# Patient Record
Sex: MALE | Race: WHITE | Employment: FULL TIME | URBAN - METROPOLITAN AREA
[De-identification: names, ages, dates, MRNs, and addresses within clinical notes are randomized per-mention and may not be internally consistent; named-entity substitution may affect disease eponyms.]

---

## 2017-01-03 ENCOUNTER — HOSPITAL ENCOUNTER (OUTPATIENT)
Dept: PHYSICAL THERAPY | Age: 47
Discharge: HOME | End: 2017-01-03
Payer: COMMERCIAL

## 2017-01-03 PROCEDURE — 97110 THERAPEUTIC EXERCISES: CPT

## 2017-01-03 PROCEDURE — 97140 MANUAL THERAPY 1/> REGIONS: CPT

## 2017-01-03 NOTE — PROGRESS NOTES
PT DAILY TREATMENT NOTE     Patient Name: July Arshad  Date:1/3/2017  : 1970  [x]  Patient  Verified  Payor: Leyla Conner / Plan: Carry Renny Pillai 77 PPO / Product Type: Commerical /    In time:5:05  Out time:6:05  Total Treatment Time (min): 60  Visit #: 2 of 6    Treatment Area: Right shoulder pain [M25.511]    SUBJECTIVE  Pain Level (0-10 scale): 2-3/10  Any medication changes, allergies to medications, adverse drug reactions, diagnosis change, or new procedure performed?: [x] No    [] Yes (see summary sheet for update)  Subjective functional status/changes:   [] No changes reported  \"I don't have as much pain now as I was having at the start of therapy. \"    OBJECTIVE    Modality rationale: decrease pain to improve the patients ability to raise R UE   Min Type Additional Details    [] Estim:  []Unatt       []IFC  []Premod                        []Other:  []w/ice   []w/heat  Position:  Location:    [] Estim: []Att    []TENS instruct  []NMES                    []Other:  []w/US   []w/ice   []w/heat  Position:  Location:    []  Traction: [] Cervical       []Lumbar                       [] Prone          []Supine                       []Intermittent   []Continuous Lbs:  [] before manual  [] after manual    []  Ultrasound: []Continuous   [] Pulsed                           []1MHz   []3MHz W/cm2:  Location:    []  Iontophoresis with dexamethasone         Location: [] Take home patch   [] In clinic   10 [x]  Ice     []  heat  []  Ice massage  []  Laser   []  Anodyne Position: seated  Location: R shoulder    []  Laser with stim  []  Other:  Position:  Location:    []  Vasopneumatic Device Pressure:       [] lo [] med [] hi   Temperature: [] lo [] med [] hi   [x] Skin assessment post-treatment:  [x]intact []redness- no adverse reaction    []redness  adverse reaction:       42 min Therapeutic Exercise:  [] See flow sheet :   Rationale: increase ROM and increase strength to improve the patients ability to return to golfing    8 min Manual Therapy:  Jt mobs; contract relax IR/ER   Rationale: decrease pain, increase ROM and increase tissue extensibility to improve mobility          With   [] TE   [] TA   [] neuro   [] other: Patient Education: [x] Review HEP    [] Progressed/Changed HEP based on:   [] positioning   [] body mechanics   [] transfers   [] heat/ice application    [] other:      Other Objective/Functional Measures: FOTO 67 pts     Pain Level (0-10 scale) post treatment: 0/10    ASSESSMENT/Changes in Function: Pt able to achieve near full R shoulder ER/IR after manual therapy today. Pain levels now 2-3/10 with end ranges only. Patient will continue to benefit from skilled PT services to address functional mobility deficits, address ROM deficits, address strength deficits, analyze and address soft tissue restrictions, analyze and cue movement patterns, analyze and modify body mechanics/ergonomics, assess and modify postural abnormalities and instruct in home and community integration to attain remaining goals. []  See Plan of Care  []  See progress note/recertification  []  See Discharge Summary         Progress towards goals / Updated goals:  Short Term Goals: To be accomplished in 2 weeks:  1. Pt will report compliance and independence to HEP to help the pt manage their pain and symptoms.    Eval: established  Current status: met  Long Term Goals: To be accomplished in 5 weeks:  1. Pt will increase FOTO score to 72 points to improve ability to perform ADLs. Eval: 65  Current status: progressing - FOTO 67 pts  2. Pt will increase MMT right shoulder flex, ER, IR and elbow EXT to 5/5 with no pain to improve ability to tolerate gym activities with less pain. Eval:right shoulder flex, ER, IR and elbow EXT 4+/5 (pain with flex, ER and IR)    Current status: Still with pain 4+/5 except IR 4/5 - progressing  3.  Pt will increase AROM right shoulder IR (at 90 degs ABD) to 50 degs or more with no pain to improve ability to reach behind his back with more ease.    Eval: 39 degs with pain  Current status: 60 with pain - progressing  4.  Pt will report being able to sleep on his right shoulder without awaking or without pain to improve sleep quality  Eval: reports pain and discomfort with sleeping on his right shoulder/side  Current status: pain not as acute when rolls onto right shoulder at night - progressing     PLAN  []  Upgrade activities as tolerated     []  Continue plan of care  []  Update interventions per flow sheet       []  Discharge due to:_  []  Other:_      Chandrika Evans, PT 1/3/2017  6:05 PM    Future Appointments  Date Time Provider Herminia Henderson   1/5/2017 5:00 PM Chandrika Evans PT [facility]   1/10/2017 5:00 PM Chandrika Evans, [facility]   1/12/2017 5:00 PM Chandrika Evans [facility]   12/21/2017 3:30 PM Dana Roman MD [address] [place] [address]

## 2017-01-05 ENCOUNTER — HOSPITAL ENCOUNTER (OUTPATIENT)
Dept: PHYSICAL THERAPY | Age: 47
Discharge: HOME | End: 2017-01-05
Payer: COMMERCIAL

## 2017-01-05 PROCEDURE — 97140 MANUAL THERAPY 1/> REGIONS: CPT

## 2017-01-05 PROCEDURE — 97110 THERAPEUTIC EXERCISES: CPT

## 2017-01-05 NOTE — PROGRESS NOTES
PT DAILY TREATMENT NOTE     Patient Name: Bobbi Bustillo  Date:2017  : 1970  [x]  Patient  Verified  Payor: Aubrey Deal / Plan: Carry Renny Pillai 77 PPO / Product Type: Commerical /    In time:5:00  Out time:5:58  Total Treatment Time (min): 62  Visit #: 3 of 6    Treatment Area: Right shoulder pain [M25.511]    SUBJECTIVE  Pain Level (0-10 scale): 0/10  Any medication changes, allergies to medications, adverse drug reactions, diagnosis change, or new procedure performed?: [x] No    [] Yes (see summary sheet for update)  Subjective functional status/changes:   [] No changes reported  \"I feel pretty good. I didn't have much problems with the R shoulder after last visit. \"    OBJECTIVE    Modality rationale: decrease pain to improve the patients ability to raise R UE   Min Type Additional Details    [] Estim:  []Unatt       []IFC  []Premod                        []Other:  []w/ice   []w/heat  Position:  Location:    [] Estim: []Att    []TENS instruct  []NMES                    []Other:  []w/US   []w/ice   []w/heat  Position:  Location:    []  Traction: [] Cervical       []Lumbar                       [] Prone          []Supine                       []Intermittent   []Continuous Lbs:  [] before manual  [] after manual    []  Ultrasound: []Continuous   [] Pulsed                           []1MHz   []3MHz W/cm2:  Location:    []  Iontophoresis with dexamethasone         Location: [] Take home patch   [] In clinic   10 [x]  Ice     []  heat  []  Ice massage  []  Laser   []  Anodyne Position: seated  Location: R shoulder    []  Laser with stim  []  Other:  Position:  Location:    []  Vasopneumatic Device Pressure:       [] lo [] med [] hi   Temperature: [] lo [] med [] hi   [x] Skin assessment post-treatment:  [x]intact []redness- no adverse reaction    []redness  adverse reaction:       38 min Therapeutic Exercise:  [] See flow sheet :   Rationale: increase ROM and increase strength to improve the patients ability to return to golfing    10 min Manual Therapy:  Scapular mobs; GHJ glides; contract relax IR/ER   Rationale: decrease pain, increase ROM and increase tissue extensibility to improve mobility          With   [] TE   [] TA   [] neuro   [] other: Patient Education: [x] Review HEP    [] Progressed/Changed HEP based on:   [] positioning   [] body mechanics   [] transfers   [] heat/ice application    [] other:      Other Objective/Functional Measures: FOTO 67 pts     Pain Level (0-10 scale) post treatment: 0/10    ASSESSMENT/Changes in Function: Pt exhibits full ROM of R shoulder with mild pain levels at end ranges of 90/90 IR/ER. Pt to finish out last 3 visits and D/C to continue to self-manage care independently. Patient will continue to benefit from skilled PT services to address functional mobility deficits, address ROM deficits, address strength deficits, analyze and address soft tissue restrictions, analyze and cue movement patterns, analyze and modify body mechanics/ergonomics, assess and modify postural abnormalities and instruct in home and community integration to attain remaining goals. []  See Plan of Care  []  See progress note/recertification  []  See Discharge Summary         Progress towards goals / Updated goals:  Short Term Goals: To be accomplished in 2 weeks:  1. Pt will report compliance and independence to HEP to help the pt manage their pain and symptoms.    Eval: established  Current status: met  Long Term Goals: To be accomplished in 5 weeks:  1. Pt will increase FOTO score to 72 points to improve ability to perform ADLs. Eval: 65  Current status: progressing - FOTO 67 pts  2. Pt will increase MMT right shoulder flex, ER, IR and elbow EXT to 5/5 with no pain to improve ability to tolerate gym activities with less pain.   Eval:right shoulder flex, ER, IR and elbow EXT 4+/5 (pain with flex, ER and IR)    Current status: progressing - Still with pain 4+/5 except IR 4/5   3. Pt will increase AROM right shoulder IR (at 90 degs ABD) to 50 degs or more with no pain to improve ability to reach behind his back with more ease.    Eval: 39 degs with pain  Current status: progressing - 60 deg with pain   4.  Pt will report being able to sleep on his right shoulder without awaking or without pain to improve sleep quality  Eval: reports pain and discomfort with sleeping on his right shoulder/side  Current status: met - able to sleep on R shoulder without much discomfort     PLAN  []  Upgrade activities as tolerated     []  Continue plan of care  []  Update interventions per flow sheet       []  Discharge due to:_  [x]  Other:_Finish out visits and D/C      Chandrika Evans, PT 1/5/2017  6:05 PM    Future Appointments  Date Time Provider Herminia Henderson   1/10/2017 5:00 PM Miracle Evans, PT [facility]   1/12/2017 5:00 PM Chandrika Evans PT [facility]   12/21/2017 3:30 PM Manolo Donis MD [address] [place] [address]

## 2017-01-10 ENCOUNTER — HOSPITAL ENCOUNTER (OUTPATIENT)
Dept: PHYSICAL THERAPY | Age: 47
Discharge: HOME | End: 2017-01-10
Payer: COMMERCIAL

## 2017-01-10 PROCEDURE — 97110 THERAPEUTIC EXERCISES: CPT

## 2017-01-10 PROCEDURE — 97140 MANUAL THERAPY 1/> REGIONS: CPT

## 2017-01-10 NOTE — PROGRESS NOTES
PT DAILY TREATMENT NOTE     Patient Name: Jennifer Stratton  Date:1/10/2017  : 1970  [x]  Patient  Verified  Payor: Liss Case / Plan: Carli Renny Pillai 77 PPO / Product Type: Commerical /    In time:5:00  Out time:6:00  Total Treatment Time (min): 60  Visit #: 4 of 6    Treatment Area: Right shoulder pain [M25.511]    SUBJECTIVE  Pain Level (0-10 scale): 0/10  Any medication changes, allergies to medications, adverse drug reactions, diagnosis change, or new procedure performed?: [x] No    [] Yes (see summary sheet for update)  Subjective functional status/changes:   [] No changes reported  \"I've had some pain while sleeping. If I lie on my R side with the hand bent towards my ear, the shoulder hurts a little. I have to sleep with the arm straight down by my side. \"    OBJECTIVE    Modality rationale: decrease pain to improve the patients ability to raise R UE   Min Type Additional Details    [] Estim:  []Unatt       []IFC  []Premod                        []Other:  []w/ice   []w/heat  Position:  Location:    [] Estim: []Att    []TENS instruct  []NMES                    []Other:  []w/US   []w/ice   []w/heat  Position:  Location:    []  Traction: [] Cervical       []Lumbar                       [] Prone          []Supine                       []Intermittent   []Continuous Lbs:  [] before manual  [] after manual    []  Ultrasound: []Continuous   [] Pulsed                           []1MHz   []3MHz W/cm2:  Location:    []  Iontophoresis with dexamethasone         Location: [] Take home patch   [] In clinic   10 [x]  Ice     []  heat  []  Ice massage  []  Laser   []  Anodyne Position: seated  Location: R shoulder    []  Laser with stim  []  Other:  Position:  Location:    []  Vasopneumatic Device Pressure:       [] lo [] med [] hi   Temperature: [] lo [] med [] hi   [x] Skin assessment post-treatment:  [x]intact []redness- no adverse reaction    []redness  adverse reaction:       42 min Therapeutic Exercise:  [] See flow sheet :   Rationale: increase ROM and increase strength to improve the patients ability to return to golfing    8 min Manual Therapy:  Scapular mobs; manual stretching all planes   Rationale: decrease pain, increase ROM and increase tissue extensibility to improve mobility          With   [] TE   [] TA   [] neuro   [] other: Patient Education: [x] Review HEP    [] Progressed/Changed HEP based on:   [] positioning   [] body mechanics   [] transfers   [] heat/ice application    [] other:      Other Objective/Functional Measures: Continued with current program     Pain Level (0-10 scale) post treatment: 0/10    ASSESSMENT/Changes in Function: Pt continues to progress with end range flex/abd and 90/90 ER. Pt able to achieve pain free status at end of session. Patient will continue to benefit from skilled PT services to address functional mobility deficits, address ROM deficits, address strength deficits, analyze and address soft tissue restrictions, analyze and cue movement patterns, analyze and modify body mechanics/ergonomics, assess and modify postural abnormalities and instruct in home and community integration to attain remaining goals. []  See Plan of Care  []  See progress note/recertification  []  See Discharge Summary         Progress towards goals / Updated goals:  Short Term Goals: To be accomplished in 2 weeks:  1. Pt will report compliance and independence to HEP to help the pt manage their pain and symptoms.    Eval: established  Current status: met  Long Term Goals: To be accomplished in 5 weeks:  1. Pt will increase FOTO score to 72 points to improve ability to perform ADLs. Eval: 65  Current status: progressing - FOTO 67 pts  2. Pt will increase MMT right shoulder flex, ER, IR and elbow EXT to 5/5 with no pain to improve ability to tolerate gym activities with less pain.   Eval:right shoulder flex, ER, IR and elbow EXT 4+/5 (pain with flex, ER and IR)    Current status: progressing - Still with pain 4+/5 except IR 4/5   3. Pt will increase AROM right shoulder IR (at 90 degs ABD) to 50 degs or more with no pain to improve ability to reach behind his back with more ease.    Eval: 39 degs with pain  Current status: progressing - 60 deg with pain   4.  Pt will report being able to sleep on his right shoulder without awaking or without pain to improve sleep quality  Eval: reports pain and discomfort with sleeping on his right shoulder/side  Current status: met - able to sleep on R shoulder without much discomfort     PLAN  []  Upgrade activities as tolerated     []  Continue plan of care  []  Update interventions per flow sheet       []  Discharge due to:_  [x]  Other:_Finish out visits and D/C      Chandrika Evans PT 1/10/2017  6:05 PM    Future Appointments  Date Time Provider Herminia Henderson   1/12/2017 5:00 PM [address] MIESHA Evans [facility]   12/21/2017 3:30 PM Ky Verde MD [address] [place] [address]

## 2017-01-12 ENCOUNTER — HOSPITAL ENCOUNTER (OUTPATIENT)
Dept: PHYSICAL THERAPY | Age: 47
Discharge: HOME | End: 2017-01-12
Payer: COMMERCIAL

## 2017-01-12 PROCEDURE — 97140 MANUAL THERAPY 1/> REGIONS: CPT

## 2017-01-12 PROCEDURE — 97110 THERAPEUTIC EXERCISES: CPT

## 2017-01-12 NOTE — PROGRESS NOTES
PT DAILY TREATMENT NOTE     Patient Name: Shedrick Lennox  Date:2017  : 1970  [x]  Patient  Verified  Payor: Kaylie Diaz / Plan: Carli Renny Pillai 77 PPO / Product Type: Commerical /    In time:5:00  Out time:6:00  Total Treatment Time (min): 60  Visit #: 5 of 6    Treatment Area: Right shoulder pain [M25.511]    SUBJECTIVE  Pain Level (0-10 scale): 0/10  Any medication changes, allergies to medications, adverse drug reactions, diagnosis change, or new procedure performed?: [x] No    [] Yes (see summary sheet for update)  Subjective functional status/changes:   [] No changes reported  \"I've had a long day. I'm just tired. \"    OBJECTIVE    Modality rationale: decrease pain to improve the patients ability to raise R UE   Min Type Additional Details    [] Estim:  []Unatt       []IFC  []Premod                        []Other:  []w/ice   []w/heat  Position:  Location:    [] Estim: []Att    []TENS instruct  []NMES                    []Other:  []w/US   []w/ice   []w/heat  Position:  Location:    []  Traction: [] Cervical       []Lumbar                       [] Prone          []Supine                       []Intermittent   []Continuous Lbs:  [] before manual  [] after manual    []  Ultrasound: []Continuous   [] Pulsed                           []1MHz   []3MHz W/cm2:  Location:    []  Iontophoresis with dexamethasone         Location: [] Take home patch   [] In clinic   10 [x]  Ice     []  heat  []  Ice massage  []  Laser   []  Anodyne Position: seated  Location: R shoulder    []  Laser with stim  []  Other:  Position:  Location:    []  Vasopneumatic Device Pressure:       [] lo [] med [] hi   Temperature: [] lo [] med [] hi   [x] Skin assessment post-treatment:  [x]intact []redness- no adverse reaction    []redness  adverse reaction:       42 min Therapeutic Exercise:  [] See flow sheet :   Rationale: increase ROM and increase strength to improve the patients ability to return to golfing    8 min Manual Therapy:  Scapular mobs; IR contract relax release   Rationale: decrease pain, increase ROM and increase tissue extensibility to improve mobility          With   [x] TE   [] TA   [] neuro   [] other: Patient Education: [x] Review HEP    [x] Progressed/Changed HEP based on: horizontal ADD stretch on foam roll; post capsule stretch  [] positioning   [] body mechanics   [] transfers   [] heat/ice application    [] other:      Other Objective/Functional Measures:      Pain Level (0-10 scale) post treatment: 0/10    ASSESSMENT/Changes in Function: Pt has met all goals and is ready to transition to HEP and gym workout. Pt will finish out last visit and D/C. Patient will continue to benefit from skilled PT services to address functional mobility deficits, address ROM deficits, address strength deficits, analyze and address soft tissue restrictions, analyze and cue movement patterns, analyze and modify body mechanics/ergonomics, assess and modify postural abnormalities and instruct in home and community integration to attain remaining goals. []  See Plan of Care  []  See progress note/recertification  []  See Discharge Summary         Progress towards goals / Updated goals:  Short Term Goals: To be accomplished in 2 weeks:  1. Pt will report compliance and independence to HEP to help the pt manage their pain and symptoms.    Eval: established  Current status: met  Long Term Goals: To be accomplished in 5 weeks:  1. Pt will increase FOTO score to 72 points to improve ability to perform ADLs. Eval: 65  Current status: progressing - FOTO 67 pts (reassess next visit)  2. Pt will increase MMT right shoulder flex, ER, IR and elbow EXT to 5/5 with no pain to improve ability to tolerate gym activities with less pain. Eval:right shoulder flex, ER, IR and elbow EXT 4+/5 (pain with flex, ER and IR)    Current status: met - 5/5 R shoulder strength grossly without pain  3.  Pt will increase AROM right shoulder IR (at 90 degs ABD) to 50 degs or more with no pain to improve ability to reach behind his back with more ease.    Eval: 39 degs with pain  Current status: met - 70 deg without pain   4.  Pt will report being able to sleep on his right shoulder without awaking or without pain to improve sleep quality  Eval: reports pain and discomfort with sleeping on his right shoulder/side  Current status: met - able to sleep on R shoulder without much discomfort     PLAN  []  Upgrade activities as tolerated     []  Continue plan of care  []  Update interventions per flow sheet       []  Discharge due to:_  [x]  Other:_ FOTO and D/C next visit    Chandrika Evans, PT 1/12/2017  6:05 PM    Future Appointments  Date Time Provider Herminia Henderson   12/21/2017 3:30 PM Sajan Woodard MD [address] [place] [address]

## 2017-01-16 ENCOUNTER — HOSPITAL ENCOUNTER (OUTPATIENT)
Dept: PHYSICAL THERAPY | Age: 47
Discharge: HOME | End: 2017-01-16
Payer: COMMERCIAL

## 2017-01-16 PROCEDURE — 97110 THERAPEUTIC EXERCISES: CPT

## 2017-01-16 PROCEDURE — 97140 MANUAL THERAPY 1/> REGIONS: CPT

## 2017-01-16 NOTE — PROGRESS NOTES
PT DAILY TREATMENT NOTE     Patient Name: Ashok Treviño  Date:2017  : 1970  [x]  Patient  Verified  Payor: Sharon Okemah / Plan: Carry Renny Ibarrajose 77 PPO / Product Type: Commerical /    In time:2:30  Out time: 3:40  Total Treatment Time (min): 70  Visit #: 6 of 6    Treatment Area: Right shoulder pain [M25.511]    SUBJECTIVE  Pain Level (0-10 scale): 1-2  Any medication changes, allergies to medications, adverse drug reactions, diagnosis change, or new procedure performed?: [x] No    [] Yes (see summary sheet for update)  Subjective functional status/changes:   [] No changes reported  I'm so much better than when I started    OBJECTIVE    Modality rationale: decrease pain to improve the patients ability to tolerate activity   Min Type Additional Details    [] Estim:  []Unatt       []IFC  []Premod                        []Other:  []w/ice   []w/heat  Position:  Location:    [] Estim: []Att    []TENS instruct  []NMES                    []Other:  []w/US   []w/ice   []w/heat  Position:  Location:    []  Traction: [] Cervical       []Lumbar                       [] Prone          []Supine                       []Intermittent   []Continuous Lbs:  [] before manual  [] after manual    []  Ultrasound: []Continuous   [] Pulsed                           []1MHz   []3MHz W/cm2:  Location:    []  Iontophoresis with dexamethasone         Location: [] Take home patch   [] In clinic   10 [x]  Ice     []  heat  []  Ice massage  []  Laser   []  Anodyne Position: Sitting  Location: R shoulder    []  Laser with stim  []  Other:  Position:  Location:    []  Vasopneumatic Device Pressure:       [] lo [] med [] hi   Temperature: [] lo [] med [] hi   [x] Skin assessment post-treatment:  [x]intact []redness- no adverse reaction    []redness  adverse reaction:       52 min Therapeutic Exercise:  [] See flow sheet :   Rationale: increase ROM and increase strength to improve the patients ability to resume Mitralignfing    8 min Manual Therapy:  Scap mobs, IR contract release   Rationale: decrease pain, increase ROM and increase tissue extensibility to improve mobility and function     With   [] TE   [] TA   [] neuro   [] other: Patient Education: [x] Review HEP    [] Progressed/Changed HEP based on:   [] positioning   [] body mechanics   [] transfers   [] heat/ice application    [] other:      Other Objective/Functional Measures:      Pain Level (0-10 scale) post treatment:  0    ASSESSMENT/Changes in Function: see goals       []  See Plan of Care  []  See progress note/recertification  [x]  See Discharge Summary         Progress towards goals / Updated goals:  1. Pt will report compliance and independence to HEP to help the pt manage their pain and symptoms.    Eval: established  Current status: met  Long Term Goals: To be accomplished in 5 weeks:  1. Pt will increase FOTO score to 72 points to improve ability to perform ADLs. Eval: 65  Current status: progressing - FOTO 67 pts (reassess next visit)  2. Pt will increase MMT right shoulder flex, ER, IR and elbow EXT to 5/5 with no pain to improve ability to tolerate gym activities with less pain. Eval:right shoulder flex, ER, IR and elbow EXT 4+/5 (pain with flex, ER and IR)    Current status: met - 5/5 R shoulder strength grossly without pain  3. Pt will increase AROM right shoulder IR (at 90 degs ABD) to 50 degs or more with no pain to improve ability to reach behind his back with more ease.    Eval: 39 degs with pain  Current status: met - 70 deg without pain   4.  Pt will report being able to sleep on his right shoulder without awaking or without pain to improve sleep quality  Eval: reports pain and discomfort with sleeping on his right shoulder/side  Current status: met - able to sleep on R shoulder without much discomfort      PLAN  []  Upgrade activities as tolerated     []  Continue plan of care  []  Update interventions per flow sheet       [x]  Discharge due to:_  []  Other:_      Linda Hinojosa, MATTHEW 1/16/2017  2:52 PM    Future Appointments  Date Time Provider Herminia Henderson   12/21/2017 3:30 PM Carlos Botello MD [address] [place] [address]

## 2017-01-23 NOTE — PROGRESS NOTES
[facility]MENARES [facility], [address] [place] [address]  [phone] fax    Discharge Summary    Patient name: Hannah Ospina Start of Care: 16   Referral source: Leatha John DO : 1970   Medical/Treatment Diagnosis: Right shoulder pain [M25.511] Onset Date:   Prior Hospitalization: see medical history Provider#: [id number]   Medications: Verified on Patient Summary List     Comorbidities: none  Prior Level of Function: Independent with ADLs, functional and recreational tasks with no right shoulder pain. Plays golf frequently and participates in gym activities. Visits from Start of Care: 10    Missed Visits: 0    Reporting Period : 12/15/16 to 17  Short Term Goals: To be accomplished in 1 week:  1. Pt will report compliance and independence to [facility] to help the pt manage their pain and symptoms.    Eval: established  Current status: met  Long Term Goals: To be accomplished in 5 weeks:  1. Pt will increase FOTO score to 72 points to improve ability to perform ADLs. Eval: 65  Current status: progressing - FOTO 67 pts (reassess next visit)  2. Pt will increase MMT right shoulder flex, ER, IR and elbow EXT to 5/5 with no pain to improve ability to tolerate gym activities with less pain. Eval:right shoulder flex, ER, IR and elbow EXT 4+/5 (pain with flex, ER and IR)    Current status: met - 5/5 R shoulder strength grossly without pain  3. Pt will increase AROM right shoulder IR (at 90 degs ABD) to 50 degs or more with no pain to improve ability to reach behind his back with more ease.    Eval: 39 degs with pain  Current status: met - 70 deg without pain   4.  Pt will report being able to sleep on his right shoulder without awaking or without pain to improve sleep quality  Eval: reports pain and discomfort with sleeping on his right shoulder/side  Current status: met - able to sleep on R shoulder without much discomfort     Assessment/ Summary of Care: Pt made excellent progress with PT. Pt exhibited full R shoulder AROM and strength without pain. Pt is able to sleep on R shoulder without increased pain. Pt is compliant with updated HEP and is appropriate to return to PLOF at this time and be discharged.   Thank you for this referral.    RECOMMENDATIONS:  [x]Discontinue therapy: [x]Patient has reached or is progressing toward set goals      []Patient is non-compliant or has abdicated      []Due to lack of appreciable progress towards set goals    Chandrika Evans, PT 1/23/2017 10:06 AM

## 2017-08-09 ENCOUNTER — OFFICE VISIT (OUTPATIENT)
Dept: UROLOGY | Age: 47
End: 2017-08-09

## 2017-08-09 VITALS
HEIGHT: 70 IN | SYSTOLIC BLOOD PRESSURE: 92 MMHG | BODY MASS INDEX: 30.35 KG/M2 | WEIGHT: 212 LBS | DIASTOLIC BLOOD PRESSURE: 56 MMHG | TEMPERATURE: 97.7 F | HEART RATE: 36 BPM | OXYGEN SATURATION: 96 %

## 2017-08-09 DIAGNOSIS — N45.1 EPIDIDYMITIS, LEFT: Primary | ICD-10-CM

## 2017-08-09 DIAGNOSIS — N36.44 DETRUSOR SPHINCTER DYSSYNERGIA: ICD-10-CM

## 2017-08-09 DIAGNOSIS — N50.812 TESTICULAR PAIN, LEFT: ICD-10-CM

## 2017-08-09 LAB
BILIRUB UR QL STRIP: NEGATIVE
GLUCOSE UR-MCNC: NEGATIVE MG/DL
KETONES P FAST UR STRIP-MCNC: NEGATIVE MG/DL
PH UR STRIP: 5.5 [PH] (ref 4.6–8)
PROT UR QL STRIP: NEGATIVE MG/DL
SP GR UR STRIP: 1.03 (ref 1–1.03)
UA UROBILINOGEN AMB POC: NORMAL (ref 0.2–1)
URINALYSIS CLARITY POC: CLEAR
URINALYSIS COLOR POC: YELLOW
URINE BLOOD POC: NEGATIVE
URINE LEUKOCYTES POC: NEGATIVE
URINE NITRITES POC: NEGATIVE

## 2017-08-09 RX ORDER — CIPROFLOXACIN 500 MG/1
500 TABLET ORAL 2 TIMES DAILY
Qty: 42 TAB | Refills: 0 | Status: SHIPPED | OUTPATIENT
Start: 2017-08-09 | End: 2017-08-30

## 2017-08-09 NOTE — PROGRESS NOTES
Mr. Barbara Hobson has a reminder for a \"due or due soon\" health maintenance. I have asked that he contact his primary care provider for follow-up on this health maintenance.

## 2017-08-09 NOTE — MR AVS SNAPSHOT
Visit Information Date & Time Provider Department Dept. Phone Encounter #  
 8/9/2017  3:30 PM Richmond Siddiqui, [facility] [phone] [id number] Your Appointments 12/21/2017  3:30 PM  
Office Visit with Richmond Siddiqui MD  
[facility] [address]) Appt Note: check up [address] [place] [address]  
[phone] [address] [facility] [address] Upcoming Health Maintenance Date Due DTaP/Tdap/Td series (1 - Tdap) 8/20/1989 INFLUENZA AGE 9 TO ADULT 8/1/2017 Allergies as of 8/9/2017  Review Complete On: 8/9/2017 By: Richmond Siddiqui MD  
  
 Severity Noted Reaction Type Reactions Valium [Diazepam] High 11/30/2012    Other (comments)  
 hyperactivity Current Immunizations  Never Reviewed No immunizations on file. Not reviewed this visit You Were Diagnosed With   
  
 Codes Comments Testicular pain, left    -  Primary ICD-10-CM: R10.727 ICD-9-CM: 608.9 Epididymitis, left     ICD-10-CM: N45.1 ICD-9-CM: 604.90 Vitals BP Pulse Temp Height(growth percentile) Weight(growth percentile) SpO2  
 92/56 (BP 1 Location: Left arm, BP Patient Position: Sitting) (!) 36 97.7 °F (36.5 °C) 5' 10\" (1.778 m) 212 lb (96.2 kg) 96% BMI Smoking Status 30.42 kg/m2 Former Smoker BMI and BSA Data Body Mass Index Body Surface Area  
 30.42 kg/m 2 2.18 m 2 Preferred Pharmacy Pharmacy Name Phone [id number] [place] [facility] [phone] Your Updated Medication List  
  
   
This list is accurate as of: 8/9/17  4:19 PM.  Always use your most recent med list.  
  
  
  
  
 AMBIEN CR 12.5 mg tablet Generic drug:  zolpidem CR Take 12.5 mg by mouth nightly as needed. ciprofloxacin HCl 500 mg tablet Commonly known as:  CIPRO Take 1 Tab by mouth two (2) times a day for 21 days. HYDROmorphone 8 mg tablet Commonly known as:  DILAUDID Take 8 mg by mouth every four (4) hours as needed. ibuprofen 800 mg tablet Commonly known as:  MOTRIN Take  by mouth.  
  
 lidocaine 5 % Commonly known as:  LIDODERM  
  
 MAXALT-MLT 10 mg disintegrating tablet Generic drug:  rizatriptan Take 10 mg by mouth once as needed for Migraine. nadolol 20 mg tablet Commonly known as:  CORGARD Take 20 mg by mouth every other day. oxybutynin 5 mg tablet Commonly known as:  WYLGFRSR Take 1 Tab by mouth daily. promethazine 25 mg tablet Commonly known as:  PHENERGAN Take 25 mg by mouth every six (6) hours as needed. * tamsulosin 0.4 mg capsule Commonly known as:  FLOMAX TAKE 1 CAPSULE BY MOUTH DAILY * tamsulosin 0.4 mg capsule Commonly known as:  FLOMAX Take 1 Cap by mouth daily. ZANAFLEX 4 mg capsule Generic drug:  tiZANidine Take 4 mg by mouth three (3) times daily. * Notice: This list has 2 medication(s) that are the same as other medications prescribed for you. Read the directions carefully, and ask your doctor or other care provider to review them with you. Prescriptions Sent to Pharmacy Refills  
 ciprofloxacin HCl (CIPRO) 500 mg tablet 0 Sig: Take 1 Tab by mouth two (2) times a day for 21 days. Class: Normal  
 Pharmacy: [facility]/pharmacy [address] [place] [address],[address] [place] [address] Ph #: [phone] Route: Oral  
  
We Performed the Following AMB POC URINALYSIS DIP STICK AUTO W/O MICRO [29162 CPT(R)] Patient Instructions Testicular Pain: Care Instructions Your Care Instructions Pain in the testicles can be caused by many things. These include an injury to your testicles, an infection, and testicular torsion.  
Injuries and genital problems most often happen during sports or recreational activities, at work, or in a fall. Pain caused by an injury usually goes away quickly. There is usually no long-term harm to your testicles. Infections that may cause pain include: · An infection of the testicles. This is called orchitis. · An abscess in the scrotum or testicles. · Some sexually transmitted infections (STIs). · A swelling of the tube attached to a testicle. This swelling is called epididymitis. It can cause pain and is sometimes caused by an infection. Testicular torsion happens when a testicle twists on the spermatic cord. This cuts off the blood supply to the testicle. This is a serious condition that requires surgery. Follow-up care is a key part of your treatment and safety. Be sure to make and go to all appointments, and call your doctor if you are having problems. It's also a good idea to know your test results and keep a list of the medicines you take. How can you care for yourself at home? · Rest and protect your testicles and groin. Stop, change, or take a break from any activity that may be causing your pain or soreness. · Put ice or a cold pack on the area for 10 to 20 minutes at a time. Put a thin cloth between the ice and your skin. · Wear briefs, not boxers. Briefs help support the injured area. You can use a jock strap if it helps relieve your pain. · If your doctor prescribed antibiotics, take them as directed. Do not stop taking them just because you feel better. You need to take the full course of antibiotics. · Ask your doctor if you can take an over-the-counter pain medicine, such as acetaminophen (Tylenol), ibuprofen (Advil, Motrin), or naproxen (Aleve). Be safe with medicines. Read and follow all instructions on the label. · If the doctor gave you a prescription medicine for pain, take it as prescribed. When should you call for help? Call your doctor now or seek immediate medical care if: 
· You have severe or increasing pain. · You notice a change in how your testicles look or are positioned in your scrotum. · You notice new or worse swelling in your scrotum. · You have symptoms of a urinary problem, such as a urinary tract infection. These may include: 
¨ Pain or burning when you urinate. ¨ A frequent need to urinate without being able to pass much urine. ¨ Pain in the flank, which is just below the rib cage and above the waist on either side of the back. ¨ Blood in your urine. ¨ A fever. Watch closely for changes in your health, and be sure to contact your doctor if: 
· You do not get better as expected. Where can you learn more? Go to http://florecitaDali Wirelessavila.info/. Enter Q792 in the search box to learn more about \"Testicular Pain: Care Instructions. \" Current as of: August 12, 2016 Content Version: 11.3 © 9714-8786 [employer]. Care instructions adapted under license by [employer] (which disclaims liability or warranty for this information). If you have questions about a medical condition or this instruction, always ask your healthcare professional. [address] any warranty or liability for your use of this information. Introducing [facility]! Caterina Richard introduces [employer] patient portal. Now you can access parts of your medical record, email your doctor's office, and request medication refills online. 1. In your internet browser, go to [email or web address] 2. Click on the First Time User? Click Here link in the Sign In box. You will see the New Member Sign Up page. 3. Enter your [employer] Access Code exactly as it appears below. You will not need to use this code after youve completed the sign-up process. If you do not sign up before the expiration date, you must request a new code. · [employer] Access Code: [email or web address] Expires: 11/7/2017  3:28 PM 
 
 4. Enter the last four digits of your Social Security Number (xxxx) and Date of Birth (mm/dd/yyyy) as indicated and click Submit. You will be taken to the next sign-up page. 5. Create a Peerby ID. This will be your Peerby login ID and cannot be changed, so think of one that is secure and easy to remember. 6. Create a Peerby password. You can change your password at any time. 7. Enter your Password Reset Question and Answer. This can be used at a later time if you forget your password. 8. Enter your e-mail address. You will receive e-mail notification when new information is available in [address] 9. Click Sign Up. You can now view and download portions of your medical record. 10. Click the Download Summary menu link to download a portable copy of your medical information. If you have questions, please visit the Frequently Asked Questions section of the Peerby website. Remember, Peerby is NOT to be used for urgent needs. For medical emergencies, dial 911. Now available from your iPhone and Android! Please provide this summary of care documentation to your next provider. Your primary care clinician is listed as [address]. If you have any questions after today's visit, please call [phone].

## 2017-08-09 NOTE — PATIENT INSTRUCTIONS
Testicular Pain: Care Instructions  Your Care Instructions    Pain in the testicles can be caused by many things. These include an injury to your testicles, an infection, and testicular torsion. Injuries and genital problems most often happen during sports or recreational activities, at work, or in a fall. Pain caused by an injury usually goes away quickly. There is usually no long-term harm to your testicles. Infections that may cause pain include:  · An infection of the testicles. This is called orchitis. · An abscess in the scrotum or testicles. · Some sexually transmitted infections (STIs). · A swelling of the tube attached to a testicle. This swelling is called epididymitis. It can cause pain and is sometimes caused by an infection. Testicular torsion happens when a testicle twists on the spermatic cord. This cuts off the blood supply to the testicle. This is a serious condition that requires surgery. Follow-up care is a key part of your treatment and safety. Be sure to make and go to all appointments, and call your doctor if you are having problems. It's also a good idea to know your test results and keep a list of the medicines you take. How can you care for yourself at home? · Rest and protect your testicles and groin. Stop, change, or take a break from any activity that may be causing your pain or soreness. · Put ice or a cold pack on the area for 10 to 20 minutes at a time. Put a thin cloth between the ice and your skin. · Wear briefs, not boxers. Briefs help support the injured area. You can use a jock strap if it helps relieve your pain. · If your doctor prescribed antibiotics, take them as directed. Do not stop taking them just because you feel better. You need to take the full course of antibiotics. · Ask your doctor if you can take an over-the-counter pain medicine, such as acetaminophen (Tylenol), ibuprofen (Advil, Motrin), or naproxen (Aleve). Be safe with medicines.  Read and follow all instructions on the label. · If the doctor gave you a prescription medicine for pain, take it as prescribed. When should you call for help? Call your doctor now or seek immediate medical care if:  · You have severe or increasing pain. · You notice a change in how your testicles look or are positioned in your scrotum. · You notice new or worse swelling in your scrotum. · You have symptoms of a urinary problem, such as a urinary tract infection. These may include:  ¨ Pain or burning when you urinate. ¨ A frequent need to urinate without being able to pass much urine. ¨ Pain in the flank, which is just below the rib cage and above the waist on either side of the back. ¨ Blood in your urine. ¨ A fever. Watch closely for changes in your health, and be sure to contact your doctor if:  · You do not get better as expected. Where can you learn more? Go to http://florecita-avila.info/. Enter X381 in the search box to learn more about \"Testicular Pain: Care Instructions. \"  Current as of: August 12, 2016  Content Version: 11.3  © 4231-2634 [employer]. Care instructions adapted under license by [employer] (which disclaims liability or warranty for this information). If you have questions about a medical condition or this instruction, always ask your healthcare professional. [address] any warranty or liability for your use of this information.

## 2017-08-10 NOTE — PROGRESS NOTES
Joan May 48 y.o. male     Mr. 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-irritable bladder symptoms previously treated with anticholinergic Ditropan now no longer taken-patient is voiding with a solid stream good control nocturia once per night-no daytime urgency or frequency     PSA 0.4 in 2012  PSA 0.4 in 2014  PSA 0.4 in 2016          Review of Systems:   CNS: No syncope seizures or visual changes no headaches  Respiratory: No wheezing or shortness of breath  CardiovascularNo palpitations or chest pain  Intestinal:No dyspepsia or change in bowel habits  Urinary: Irritable bladder symptoms relieved by alpha blocker treatment  Skeletal: Large joint arthritis  Endocrine:No diabetes no thyroid disease  Other:     Allergies: Allergies   Allergen Reactions    Valium [Diazepam] Other (comments)     hyperactivity      Medications:    Current Outpatient Prescriptions   Medication Sig Dispense Refill    ciprofloxacin HCl (CIPRO) 500 mg tablet Take 1 Tab by mouth two (2) times a day for 21 days. 42 Tab 0    tamsulosin (FLOMAX) 0.4 mg capsule TAKE 1 CAPSULE BY MOUTH DAILY 90 Cap 0    HYDROmorphone (DILAUDID) 8 mg tablet Take 8 mg by mouth every four (4) hours as needed.  tiZANidine (ZANAFLEX) 4 mg capsule Take 4 mg by mouth three (3) times daily.  ibuprofen (MOTRIN) 800 mg tablet Take  by mouth.  zolpidem CR (AMBIEN CR) 12.5 mg tablet Take 12.5 mg by mouth nightly as needed.  rizatriptan (MAXALT-MLT) 10 mg disintegrating tablet Take 10 mg by mouth once as needed for Migraine.  tamsulosin (FLOMAX) 0.4 mg capsule Take 1 Cap by mouth daily.  30 Cap 3    lidocaine (LIDODERM) 5 %(700 mg/patch)       oxybutynin (DITROPAN) 5 mg tablet Take 1 Tab by mouth daily. 30 Tab 6    nadolol (CORGARD) 20 mg tablet Take 20 mg by mouth every other day.  promethazine (PHENERGAN) 25 mg tablet Take 25 mg by mouth every six (6) hours as needed. Past Medical History:   Diagnosis Date    Back problem     Burning with urination     Epilepsy ([address]     as child only    Headache     Heartburn     Joint pain     Trouble in sleeping       Past Surgical History:   Procedure Laterality Date    HX ORTHOPAEDIC      left shoulder surgery    HX ORTHOPAEDIC      right knee surgery     Family History   Problem Relation Age of Onset    Diabetes Mother     Hypertension Mother         Physical Examination: Well-nourished mature male in no apparent distress  Penis is normal  Scrotum is normal  Left epididymis is tender but not enlarged and not indurated mild tenderness left spermatic cord/normal testes bilaterally normal right epididymis and spermatic cord  No inguinal adenopathy hernia or masses  prostate by PAIGE is rounded, smooth, benign in consistency and nontender-no induration no nodularity      Urinalysis: Negative dipstick/nitrite negative    Impression: Left epididymitis (reflux of sterile urine down left vas deferens is most likely mechanism in this case secondary to detrusor sphincter dyssynergia)      Plan: Cipro 500 mg twice daily ×3 weeks             NSAID, scrotal supporter             Continue alpha-blocker treatment with Flomax 0.4 mg daily    rtc 6 weeks if symptoms persist      More than 1/2 of this 25  minute visit was spent in counselling and coordination of care, as described above. Kanika Dickson MD  -electronically signed-    PLEASE NOTE:  This document has been produced using voice recognition software. Unrecognized errors in transcription may be present.

## 2018-02-14 DIAGNOSIS — N36.44 DETRUSOR AND SPHINCTER DYSSYNERGIA: ICD-10-CM

## 2018-02-14 RX ORDER — TAMSULOSIN HYDROCHLORIDE 0.4 MG/1
CAPSULE ORAL
Qty: 30 CAP | Refills: 0 | Status: SHIPPED | OUTPATIENT
Start: 2018-02-14 | End: 2018-05-30 | Stop reason: SDUPTHER

## 2018-03-29 ENCOUNTER — HOSPITAL ENCOUNTER (OUTPATIENT)
Dept: GENERAL RADIOLOGY | Age: 48
Discharge: HOME | End: 2018-03-29
Payer: COMMERCIAL

## 2018-03-29 DIAGNOSIS — R05.9 COUGH: ICD-10-CM

## 2018-03-29 PROCEDURE — 71046 X-RAY EXAM CHEST 2 VIEWS: CPT

## 2018-05-30 DIAGNOSIS — N36.44 DETRUSOR AND SPHINCTER DYSSYNERGIA: ICD-10-CM

## 2018-05-31 RX ORDER — TAMSULOSIN HYDROCHLORIDE 0.4 MG/1
CAPSULE ORAL
Qty: 30 CAP | Refills: 0 | Status: SHIPPED | OUTPATIENT
Start: 2018-05-31 | End: 2018-09-10 | Stop reason: SDUPTHER

## 2018-06-16 ENCOUNTER — HOSPITAL ENCOUNTER (OUTPATIENT)
Dept: LAB | Age: 48
Discharge: HOME | End: 2018-06-16
Payer: COMMERCIAL

## 2018-06-16 LAB
ALBUMIN SERPL-MCNC: 4 G/DL (ref 3.4–5)
ALBUMIN/GLOB SERPL: 1.3 {RATIO} (ref 0.8–1.7)
ALP SERPL-CCNC: 66 U/L (ref 45–117)
ALT SERPL-CCNC: 41 U/L (ref 16–61)
ANION GAP SERPL CALC-SCNC: 7 MMOL/L (ref 3–18)
AST SERPL-CCNC: 28 U/L (ref 15–37)
BASOPHILS # BLD: 0 K/UL (ref 0–0.06)
BASOPHILS NFR BLD: 1 % (ref 0–2)
BILIRUB SERPL-MCNC: 1.6 MG/DL (ref 0.2–1)
BUN SERPL-MCNC: 18 MG/DL (ref 7–18)
BUN/CREAT SERPL: 21 (ref 12–20)
CALCIUM SERPL-MCNC: 8.5 MG/DL (ref 8.5–10.1)
CHLORIDE SERPL-SCNC: 106 MMOL/L (ref 100–108)
CHOLEST SERPL-MCNC: 169 MG/DL
CO2 SERPL-SCNC: 28 MMOL/L (ref 21–32)
CREAT SERPL-MCNC: 0.87 MG/DL (ref 0.6–1.3)
DIFFERENTIAL METHOD BLD: ABNORMAL
EOSINOPHIL # BLD: 0.2 K/UL (ref 0–0.4)
EOSINOPHIL NFR BLD: 4 % (ref 0–5)
ERYTHROCYTE [DISTWIDTH] IN BLOOD BY AUTOMATED COUNT: 12.4 % (ref 11.6–14.5)
GLOBULIN SER CALC-MCNC: 3.1 G/DL (ref 2–4)
GLUCOSE SERPL-MCNC: 89 MG/DL (ref 74–99)
HBA1C MFR BLD: 4.9 % (ref 4.2–5.6)
HCT VFR BLD AUTO: 41.7 % (ref 36–48)
HDLC SERPL-MCNC: 39 MG/DL (ref 40–60)
HDLC SERPL: 4.3 {RATIO} (ref 0–5)
HGB BLD-MCNC: 14.5 G/DL (ref 13–16)
LDLC SERPL CALC-MCNC: 73.4 MG/DL (ref 0–100)
LIPID PROFILE,FLP: ABNORMAL
LYMPHOCYTES # BLD: 1.6 K/UL (ref 0.9–3.6)
LYMPHOCYTES NFR BLD: 27 % (ref 21–52)
MCH RBC QN AUTO: 31.7 PG (ref 24–34)
MCHC RBC AUTO-ENTMCNC: 34.8 G/DL (ref 31–37)
MCV RBC AUTO: 91 FL (ref 74–97)
MONOCYTES # BLD: 0.6 K/UL (ref 0.05–1.2)
MONOCYTES NFR BLD: 9 % (ref 3–10)
NEUTS SEG # BLD: 3.7 K/UL (ref 1.8–8)
NEUTS SEG NFR BLD: 59 % (ref 40–73)
PLATELET # BLD AUTO: 227 K/UL (ref 135–420)
PMV BLD AUTO: 9.1 FL (ref 9.2–11.8)
POTASSIUM SERPL-SCNC: 4.3 MMOL/L (ref 3.5–5.5)
PROT SERPL-MCNC: 7.1 G/DL (ref 6.4–8.2)
RBC # BLD AUTO: 4.58 M/UL (ref 4.7–5.5)
SODIUM SERPL-SCNC: 141 MMOL/L (ref 136–145)
T4 FREE SERPL-MCNC: 0.8 NG/DL (ref 0.7–1.5)
TRIGL SERPL-MCNC: 283 MG/DL (ref ?–150)
TSH SERPL DL<=0.05 MIU/L-ACNC: 1.18 UIU/ML (ref 0.36–3.74)
VLDLC SERPL CALC-MCNC: 56.6 MG/DL
WBC # BLD AUTO: 6.1 K/UL (ref 4.6–13.2)

## 2018-06-16 PROCEDURE — 83036 HEMOGLOBIN GLYCOSYLATED A1C: CPT | Performed by: FAMILY MEDICINE

## 2018-06-16 PROCEDURE — 80061 LIPID PANEL: CPT | Performed by: FAMILY MEDICINE

## 2018-06-16 PROCEDURE — 85025 COMPLETE CBC W/AUTO DIFF WBC: CPT | Performed by: FAMILY MEDICINE

## 2018-06-16 PROCEDURE — 84439 ASSAY OF FREE THYROXINE: CPT | Performed by: FAMILY MEDICINE

## 2018-06-16 PROCEDURE — 80053 COMPREHEN METABOLIC PANEL: CPT | Performed by: FAMILY MEDICINE

## 2018-06-16 PROCEDURE — 84443 ASSAY THYROID STIM HORMONE: CPT | Performed by: FAMILY MEDICINE

## 2018-06-16 PROCEDURE — 36415 COLL VENOUS BLD VENIPUNCTURE: CPT | Performed by: FAMILY MEDICINE

## 2018-09-10 DIAGNOSIS — N36.44 DETRUSOR AND SPHINCTER DYSSYNERGIA: ICD-10-CM

## 2018-09-11 RX ORDER — TAMSULOSIN HYDROCHLORIDE 0.4 MG/1
CAPSULE ORAL
Qty: 30 CAP | Refills: 0 | Status: SHIPPED | OUTPATIENT
Start: 2018-09-11 | End: 2018-10-31 | Stop reason: SDUPTHER

## 2018-10-31 DIAGNOSIS — N36.44 DETRUSOR AND SPHINCTER DYSSYNERGIA: ICD-10-CM

## 2018-10-31 RX ORDER — TAMSULOSIN HYDROCHLORIDE 0.4 MG/1
CAPSULE ORAL
Qty: 30 CAP | Refills: 0 | Status: SHIPPED | OUTPATIENT
Start: 2018-10-31 | End: 2018-12-05 | Stop reason: SDUPTHER

## 2018-10-31 NOTE — TELEPHONE ENCOUNTER
[facility] Per Dr. Lucero Patel Flomax 0.4 mg one daily #30 with no refills sent to pharmacy. Patient made follow up appointment and told he had to keep it to receive additional refills.

## 2018-12-04 ENCOUNTER — OFFICE VISIT (OUTPATIENT)
Dept: UROLOGY | Age: 48
End: 2018-12-04

## 2018-12-04 VITALS
HEIGHT: 70 IN | WEIGHT: 218 LBS | DIASTOLIC BLOOD PRESSURE: 60 MMHG | HEART RATE: 48 BPM | SYSTOLIC BLOOD PRESSURE: 100 MMHG | OXYGEN SATURATION: 94 % | BODY MASS INDEX: 31.21 KG/M2

## 2018-12-04 DIAGNOSIS — Z12.5 PROSTATE CANCER SCREENING: ICD-10-CM

## 2018-12-04 DIAGNOSIS — N36.44 DSD (DETRUSOR AND SPHINCTER DYSSYNERGIA): Primary | ICD-10-CM

## 2018-12-04 DIAGNOSIS — N40.1 BPH WITH OBSTRUCTION/LOWER URINARY TRACT SYMPTOMS: ICD-10-CM

## 2018-12-04 DIAGNOSIS — N13.8 BPH WITH OBSTRUCTION/LOWER URINARY TRACT SYMPTOMS: ICD-10-CM

## 2018-12-04 NOTE — PROGRESS NOTES
Mr. Gamble Tawana has a reminder for a \"due or due soon\" health maintenance. I have asked that he contact his primary care provider for follow-up on this health maintenance.

## 2018-12-04 NOTE — PATIENT INSTRUCTIONS
Epididymitis and Orchitis: Care Instructions  Your Care Instructions    Epididymitis is pain and swelling of the tube that is attached to each testicle. This tube is called the epididymis. Orchitis is pain and swelling of the testicle. Infection with bacteria often causes these conditions. Sexually transmitted infections (STIs) also can cause both conditions. This is often the case in men younger than 28. Other causes in boys and older men are infections from surgery or having a catheter that drains urine. The mumps virus also can cause orchitis. Anti-inflammatory or pain medicines can help with the pain. Antibiotics are used if the problem is caused by bacteria. They are not used if a virus is the cause. Your testicle may stay swollen for many days or even a few weeks. The doctor has checked you carefully, but problems can develop later. If you notice any problems or new symptoms, get medical treatment right away. Follow-up care is a key part of your treatment and safety. Be sure to make and go to all appointments, and call your doctor if you are having problems. It's also a good idea to know your test results and keep a list of the medicines you take. How can you care for yourself at home? · If your doctor prescribed antibiotics, take them as directed. Do not stop taking them just because you feel better. You need to take the full course of antibiotics. · Ask your doctor if you can take an over-the-counter pain medicine, such as acetaminophen (Tylenol), ibuprofen (Advil, Motrin), or naproxen (Aleve). Be safe with medicines. Read and follow all instructions on the label. · Limit your activity to what is comfortable. · Wear snug underwear or an athletic supporter. This can help reduce pain. · Apply either cold or heat to the swollen area. Use the one that works best for your pain. Sitting in a warm bath for 15 minutes twice a day will help reduce the swelling more quickly.   · If you have been told that an STI may have caused your condition, do not have sex until your doctor says it is safe. This will prevent spreading the infection. Tell your sex partner or partners that they need to be checked. They may need treatment. When should you call for help? Call your doctor now or seek immediate medical care if:    · Your pain gets worse.     · You have a new or higher fever.     · You have new or more swelling of your testicle.     · You have new belly pain, or your pain gets worse.    Watch closely for changes in your health, and be sure to contact your doctor if:    · You do not get better as expected. Where can you learn more? Go to http://florecita-avila.info/. Enter S360 in the search box to learn more about \"Epididymitis and Orchitis: Care Instructions. \"  Current as of: March 21, 2018  Content Version: 11.8  © 3870-4927 [employer] Care instructions adapted under license by [employer] (which disclaims liability or warranty for this information). If you have questions about a medical condition or this instruction, always ask your healthcare professional. [address] any warranty or liability for your use of this information.

## 2018-12-05 DIAGNOSIS — N36.44 DETRUSOR AND SPHINCTER DYSSYNERGIA: ICD-10-CM

## 2018-12-05 LAB
BILIRUB UR QL STRIP: NEGATIVE
GLUCOSE UR-MCNC: NEGATIVE MG/DL
KETONES P FAST UR STRIP-MCNC: NEGATIVE MG/DL
PH UR STRIP: 6.5 [PH] (ref 4.6–8)
PROT UR QL STRIP: NEGATIVE
PSA SERPL-MCNC: 0.9 NG/ML (ref 0–4)
SP GR UR STRIP: 1.02 (ref 1–1.03)
UA UROBILINOGEN AMB POC: NORMAL (ref 0.2–1)
URINALYSIS CLARITY POC: CLEAR
URINALYSIS COLOR POC: YELLOW
URINE BLOOD POC: NEGATIVE
URINE LEUKOCYTES POC: NEGATIVE
URINE NITRITES POC: NEGATIVE

## 2018-12-05 RX ORDER — TAMSULOSIN HYDROCHLORIDE 0.4 MG/1
CAPSULE ORAL
Qty: 30 CAP | Refills: 0 | Status: SHIPPED | OUTPATIENT
Start: 2018-12-05 | End: 2019-01-13 | Stop reason: SDUPTHER

## 2018-12-05 NOTE — PROGRESS NOTES
Orpha Brought 46 y.o. male     Mr. Marylene Blades seen today for follow-up detrusor sphincter dyssynergia    Patient is doing well on alpha-blocker therapy with Flomax 0.4 mg daily/Ditropan Rx discontinued because of lack of efficacy  Now voiding with a solid stream good control nocturia once or twice per night which is not bothersome    PSA 0.4 in 2012  PSA 0.4 in 2014  PSA 0.4 in 2016      PVR 10 cc in December 2018     Review of Systems:   CNS: No syncope seizures or visual changes no headaches  Respiratory: No wheezing or shortness of breath  CardiovascularNo palpitations or chest pain  Intestinal:No dyspepsia or change in bowel habits  Urinary: Irritable bladder symptoms relieved by alpha blocker treatment  Skeletal: Large joint arthritis  Endocrine:No diabetes no thyroid disease  Other:       Allergies: Allergies   Allergen Reactions    Valium [Diazepam] Other (comments)     hyperactivity      Medications:    Current Outpatient Medications   Medication Sig Dispense Refill    tamsulosin (FLOMAX) 0.4 mg capsule TAKE 1 CAPSULE BY MOUTH DAILY 90 Cap 0    HYDROmorphone (DILAUDID) 8 mg tablet Take 8 mg by mouth every four (4) hours as needed.  ibuprofen (MOTRIN) 800 mg tablet Take  by mouth.  zolpidem CR (AMBIEN CR) 12.5 mg tablet Take 12.5 mg by mouth nightly as needed.  tamsulosin (FLOMAX) 0.4 mg capsule TAKE 1 CAPSULE BY MOUTH EVERY DAY 30 Cap 0    rizatriptan (MAXALT-MLT) 10 mg disintegrating tablet Take 10 mg by mouth once as needed for Migraine.  lidocaine (LIDODERM) 5 %(700 mg/patch)       oxybutynin (DITROPAN) 5 mg tablet Take 1 Tab by mouth daily. 30 Tab 6    nadolol (CORGARD) 20 mg tablet Take 20 mg by mouth every other day.  tiZANidine (ZANAFLEX) 4 mg capsule Take 4 mg by mouth three (3) times daily.  promethazine (PHENERGAN) 25 mg tablet Take 25 mg by mouth every six (6) hours as needed.            Past Medical History:   Diagnosis Date    Back problem     Burning with urination     Epilepsy ([facility] [address]     as child only    Headache(784.0)     Heartburn     Joint pain     Trouble in sleeping       Past Surgical History:   Procedure Laterality Date    HX ORTHOPAEDIC      left shoulder surgery    HX ORTHOPAEDIC      right knee surgery     Social History     Socioeconomic History    Marital status:      Spouse name: Not on file    Number of children: Not on file    Years of education: Not on file    Highest education level: Not on file   Social Needs    Financial resource strain: Not on file    Food insecurity - worry: Not on file    Food insecurity - inability: Not on file   [employer] needs - medical: Not on file   [employer] needs - non-medical: Not on file   Occupational History    Not on file   Tobacco Use    Smoking status: Never Smoker    Smokeless tobacco: Never Used   Substance and Sexual Activity    Alcohol use: Yes    Drug use: No    Sexual activity: No   Other Topics Concern    Not on file   Social History Narrative    Not on file      Family History   Problem Relation Age of Onset    Diabetes Mother     Hypertension Mother         Physical Examination: Well-nourished mature male in no apparent distress    Prostate by PAIGE is large rounded smooth benign consistency nontender-no induration no nodularity  No rectal masses induration or tenderness    Urinalysis negative dipstick/nitrite negative/heme-negative      PVR today 10 cc      Impression: Detrusor sphincter dyssynergia responding favorably to alpha-blocker        Plan: PSA today            Flomax 0.4 mg daily #90 refill x3    RTC 1 year PSA PAIGE PVR      More than 1/2 of this 15 minute visit was spent in counselling and coordination of care, as described above. Walter Morejon MD  -electronically signed-    PLEASE NOTE:  This document has been produced using voice recognition software. Unrecognized errors in transcription may be present.

## 2019-01-13 DIAGNOSIS — N36.44 DETRUSOR AND SPHINCTER DYSSYNERGIA: ICD-10-CM

## 2019-01-14 RX ORDER — TAMSULOSIN HYDROCHLORIDE 0.4 MG/1
CAPSULE ORAL
Qty: 30 CAP | Refills: 0 | Status: SHIPPED | OUTPATIENT
Start: 2019-01-14 | End: 2019-05-22 | Stop reason: SDUPTHER

## 2019-05-22 DIAGNOSIS — N36.44 DETRUSOR AND SPHINCTER DYSSYNERGIA: ICD-10-CM

## 2019-05-22 RX ORDER — TAMSULOSIN HYDROCHLORIDE 0.4 MG/1
CAPSULE ORAL
Qty: 30 CAP | Refills: 0 | Status: SHIPPED | OUTPATIENT
Start: 2019-05-22 | End: 2019-07-01 | Stop reason: SDUPTHER

## 2019-07-01 DIAGNOSIS — N36.44 DETRUSOR AND SPHINCTER DYSSYNERGIA: ICD-10-CM

## 2019-07-01 RX ORDER — TAMSULOSIN HYDROCHLORIDE 0.4 MG/1
CAPSULE ORAL
Qty: 30 CAP | Refills: 0 | Status: SHIPPED | OUTPATIENT
Start: 2019-07-01 | End: 2019-08-09 | Stop reason: SDUPTHER

## 2019-08-09 DIAGNOSIS — N36.44 DETRUSOR AND SPHINCTER DYSSYNERGIA: ICD-10-CM

## 2019-08-11 RX ORDER — TAMSULOSIN HYDROCHLORIDE 0.4 MG/1
CAPSULE ORAL
Qty: 30 CAP | Refills: 0 | Status: SHIPPED | OUTPATIENT
Start: 2019-08-11 | End: 2019-10-23 | Stop reason: SDUPTHER

## 2019-10-23 DIAGNOSIS — N36.44 DETRUSOR AND SPHINCTER DYSSYNERGIA: ICD-10-CM

## 2019-10-23 RX ORDER — TAMSULOSIN HYDROCHLORIDE 0.4 MG/1
CAPSULE ORAL
Qty: 30 CAP | Refills: 0 | Status: SHIPPED | OUTPATIENT
Start: 2019-10-23 | End: 2019-12-01 | Stop reason: SDUPTHER

## 2019-11-19 DIAGNOSIS — Z12.5 SCREENING FOR PROSTATE CANCER: ICD-10-CM

## 2019-11-19 DIAGNOSIS — Z12.5 SCREENING FOR PROSTATE CANCER: Primary | ICD-10-CM

## 2019-12-01 DIAGNOSIS — N36.44 DETRUSOR AND SPHINCTER DYSSYNERGIA: ICD-10-CM

## 2019-12-03 RX ORDER — TAMSULOSIN HYDROCHLORIDE 0.4 MG/1
CAPSULE ORAL
Qty: 30 CAP | Refills: 0 | Status: SHIPPED | OUTPATIENT
Start: 2019-12-03 | End: 2021-03-15 | Stop reason: SDUPTHER

## 2019-12-05 ENCOUNTER — HOSPITAL ENCOUNTER (OUTPATIENT)
Dept: LAB | Age: 49
Discharge: HOME | End: 2019-12-05

## 2019-12-05 LAB — XX-LABCORP SPECIMEN COL,LCBCF: NORMAL

## 2019-12-05 PROCEDURE — 99001 SPECIMEN HANDLING PT-LAB: CPT

## 2019-12-06 LAB — PSA SERPL-MCNC: 1 NG/ML (ref 0–4)

## 2019-12-10 ENCOUNTER — OFFICE VISIT (OUTPATIENT)
Dept: UROLOGY | Age: 49
End: 2019-12-10

## 2019-12-10 VITALS
HEART RATE: 46 BPM | BODY MASS INDEX: 31.28 KG/M2 | SYSTOLIC BLOOD PRESSURE: 130 MMHG | OXYGEN SATURATION: 97 % | DIASTOLIC BLOOD PRESSURE: 66 MMHG | HEIGHT: 70 IN

## 2019-12-10 DIAGNOSIS — N36.44 DSD (DETRUSOR AND SPHINCTER DYSSYNERGIA): Primary | ICD-10-CM

## 2019-12-10 LAB
BILIRUB UR QL STRIP: NEGATIVE
GLUCOSE UR-MCNC: NEGATIVE MG/DL
KETONES P FAST UR STRIP-MCNC: NEGATIVE MG/DL
PH UR STRIP: 5.5 [PH] (ref 4.6–8)
PROT UR QL STRIP: NEGATIVE
SP GR UR STRIP: 1.03 (ref 1–1.03)
UA UROBILINOGEN AMB POC: NORMAL (ref 0.2–1)
URINALYSIS CLARITY POC: CLEAR
URINALYSIS COLOR POC: YELLOW
URINE BLOOD POC: NORMAL
URINE LEUKOCYTES POC: NEGATIVE
URINE NITRITES POC: NEGATIVE

## 2019-12-10 RX ORDER — CETIRIZINE HCL 10 MG
10 TABLET ORAL
COMMUNITY
Start: 2019-08-02

## 2019-12-10 RX ORDER — NYSTATIN AND TRIAMCINOLONE ACETONIDE 100000; 1 [USP'U]/G; MG/G
CREAM TOPICAL
COMMUNITY

## 2019-12-10 RX ORDER — TAMSULOSIN HYDROCHLORIDE 0.4 MG/1
0.4 CAPSULE ORAL DAILY
Qty: 90 CAP | Refills: 3 | Status: SHIPPED | OUTPATIENT
Start: 2019-12-10 | End: 2021-03-15 | Stop reason: SDUPTHER

## 2019-12-10 RX ORDER — OXYBUTYNIN CHLORIDE 5 MG/1
5 TABLET ORAL 3 TIMES DAILY
Qty: 90 TAB | Refills: 5 | Status: SHIPPED | OUTPATIENT
Start: 2019-12-10 | End: 2021-08-10

## 2019-12-10 NOTE — PATIENT INSTRUCTIONS
Urine Test: About This Test  What is it? A urine test checks the color, clarity (clear or cloudy), odor, concentration, and acidity (pH) of your urine. It also checks your levels of protein, sugar, blood cells, or other substances in your urine. This test is sometimes called a urinalysis. Why is this test done? A urine test may be done:  · To check for a disease or infection of the urinary tract. The urinary tract includes the kidneys, the tubes that carry urine from the kidneys to the bladder (ureters), and the bladder. It also includes the tube that carries urine from the bladder to outside the body (urethra). · To check the treatment of conditions such as diabetes, kidney stones, a urinary tract infection (UTI), high blood pressure, or some kidney or liver diseases. How can you prepare for the test?  · Before the test, don't eat foods that can change the color of your urine. Examples of these include blackberries, beets, and rhubarb. · Don't do heavy exercise before the test.  · Tell your doctor if you are menstruating or close to starting your period. Your doctor may want to wait to do the test.  · Tell your doctor about all the nonprescription and prescription medicines and herbs or other supplements you take. Some of these can affect the results of this test.  What happens during the test?  A urine test can be done in your doctor's office, clinic, or lab. Or you may be asked to collect a urine sample at home. Then you can take it to the office or lab for testing. Clean-catch midstream urine collection  · Wash your hands before you start. · If the cup you are given has a lid, remove it carefully. Set it down with the inner surface up. Don't touch the inside of the cup with your fingers. · Clean the area around your genitals. ? For men: Pull back the foreskin, if present. Clean the head of your penis with medicated towelettes or swabs. ?  For women: Spread open the genital folds of skin with one hand. Then use medicated towelettes or swabs in your other hand to clean the area where urine comes out (the urethra). Wipe the area from front to back. · Start urinating into the toilet or urinal. A woman should hold apart the genital folds of skin while she urinates. · After the urine has flowed for several seconds, place the cup into the urine stream. Collect about 2 ounces of urine without stopping your flow of urine. · Don't touch the rim of the cup to your genital area. Don't get toilet paper, pubic hair, stool (feces), menstrual blood, or anything else in the urine sample. · Finish urinating into the toilet or urinal.  · Carefully replace and tighten the lid on the cup, and then return it to the lab. If you are collecting the urine at home and can't get it to the lab in an hour, refrigerate it. Double-voided urine sample collection  This method collects the urine your body is making right now. · Urinate into the toilet or urinal. Don't collect any of this urine. · Drink a large glass of water, and wait about 30 to 40 minutes. · Then get a urine sample. Follow the instructions above for collecting a clean-catch urine sample. · Take the urine sample to the lab. If you are collecting the urine at home and can't get it to the lab in an hour, refrigerate it. Follow-up care is a key part of your treatment and safety. Be sure to make and go to all appointments, and call your doctor if you are having problems. It's also a good idea to keep a list of the medicines you take. Ask your doctor when you can expect to have your test results. Where can you learn more? Go to http://florecita-avila.info/. Enter R266 in the search box to learn more about \"Urine Test: About This Test.\"  Current as of: March 28, 2019  Content Version: 12.2  © 5337-5668 [employer].  Care instructions adapted under license by [employer] (which disclaims liability or warranty for this information). If you have questions about a medical condition or this instruction, always ask your healthcare professional. [place] [address] any warranty or liability for your use of this information.

## 2019-12-10 NOTE — PROGRESS NOTES
Mr. Edwin Stephens has a reminder for a \"due or due soon\" health maintenance. I have asked that he contact his primary care provider for follow-up on this health maintenance.

## 2019-12-10 NOTE — PROGRESS NOTES
Vicky Davis 44 y.o. male     Mr. Kiki Sears seen today for annual evaluation of detrusor sphincter dyssynergia (DSD) responding favorably at this time to alpha-blocker therapy alone with Flomax 0.4 mg daily having discontinued taking anticholinergic Ditropan several months ago when no longer experiencing irritative voiding symptoms patient describes a solid steady stream with good control nocturia once per night  PSA 0.4 in 2012  PSA 0.4 in 2014  PSA 0.4 in 2016   PSA 0.9 in December 2018  PSA 1.0 on 6 December 2019       PVR 10 cc in December 2018  PVR 25 cc in December 2019     Review of Systems:   CNS: No syncope seizures or visual changes no headaches  Respiratory: No wheezing or shortness of breath  CardiovascularNo palpitations or chest pain  Intestinal:No dyspepsia or change in bowel habits  Urinary: Irritable bladder symptoms relieved by alpha blocker treatment  Skeletal: Large joint arthritis  Endocrine:No diabetes no thyroid disease  Other:          Allergies: Allergies   Allergen Reactions    Diazepam Other (comments)     hyperactivity  Other reaction(s): neurological reaction, Other  hyperactivity      Hay Fever And Allergy Relief Other (comments)      Medications:    Current Outpatient Medications   Medication Sig Dispense Refill    cetirizine (ZYRTEC) 10 mg tablet cetirizine 10 mg tablet      tamsulosin (FLOMAX) 0.4 mg capsule Take 1 Cap by mouth daily. 90 Cap 3    oxybutynin (DITROPAN) 5 mg tablet Take 1 Tab by mouth three (3) times daily. 90 Tab 5    tamsulosin (FLOMAX) 0.4 mg capsule Take 1 Cap by mouth daily. 90 Cap 3    tamsulosin (FLOMAX) 0.4 mg capsule TAKE 1 CAPSULE BY MOUTH EVERY DAY 30 Cap 0    HYDROmorphone (DILAUDID) 8 mg tablet Take 8 mg by mouth every four (4) hours as needed.  tiZANidine (ZANAFLEX) 4 mg capsule Take 4 mg by mouth three (3) times daily.  ibuprofen (MOTRIN) 800 mg tablet Take  by mouth.         zolpidem CR (AMBIEN CR) 12.5 mg tablet Take 12.5 mg by mouth nightly as needed.  nystatin-triamcinolone (MYCOLOG II) topical cream nystatin-triamcinolone 100,000 unit/g-0.1 % topical cream      rizatriptan (MAXALT-MLT) 10 mg disintegrating tablet Take 10 mg by mouth once as needed for Migraine.  lidocaine (LIDODERM) 5 %(700 mg/patch)       oxybutynin (DITROPAN) 5 mg tablet Take 1 Tab by mouth daily. 30 Tab 6    nadolol (CORGARD) 20 mg tablet Take 20 mg by mouth every other day.  promethazine (PHENERGAN) 25 mg tablet Take 25 mg by mouth every six (6) hours as needed. Past Medical History:   Diagnosis Date    Back problem     Burning with urination     Epilepsy ([facility] [address]     as child only    Headache(784.0)     Heartburn     Joint pain     Trouble in sleeping       Past Surgical History:   Procedure Laterality Date    HX ORTHOPAEDIC      left shoulder surgery    HX ORTHOPAEDIC      right knee surgery     Social History     Socioeconomic History    Marital status:      Spouse name: Not on file    Number of children: Not on file    Years of education: Not on file    Highest education level: Not on file   Occupational History    Not on file   Social Needs    Financial resource strain: Not on file    Food insecurity:     Worry: Not on file     Inability: Not on file    Transportation needs:     Medical: Not on file     Non-medical: Not on file   Tobacco Use    Smoking status: Never Smoker    Smokeless tobacco: Never Used   Substance and Sexual Activity    Alcohol use:  Yes    Drug use: No    Sexual activity: Never   Lifestyle    Physical activity:     Days per week: Not on file     Minutes per session: Not on file    Stress: Not on file   Relationships    Social connections:     Talks on phone: Not on file     Gets together: Not on file     Attends Roman [employer] service: Not on file     Active member of club or organization: Not on file     Attends meetings of clubs or organizations: Not on file     Relationship status: Not on file    Intimate partner violence:     Fear of current or ex partner: Not on file     Emotionally abused: Not on file     Physically abused: Not on file     Forced sexual activity: Not on file   Other Topics Concern    Not on file   Social History Narrative    Not on file      Family History   Problem Relation Age of Onset    Diabetes Mother     Hypertension Mother         Physical Examination: Nourished  trim and fit appearing mature male in no apparent distress    Prostate by PAIGE is rounded, smooth, benign consistency nontender-no induration no nodularity  No rectal masses induration or tenderness    Urinalysis: Negative dipstick/nitrite negative/heme-negative    PVR today 25 cc    Impression: Detrusor sphincter dyssynergia responding favorably to alpha-blocker Rx    Plan: Flomax 0.4 mg daily 90 refill x3    RTC 1 year PSA PAIGE [id number] with [facility] at that time- this office is closing permanently in 3 days       More than 1/2 of this 15 minute visit was spent in counselling and coordination of care, as described above. Constantine Luna MD  -electronically signed-    PLEASE NOTE:  This document has been produced using voice recognition software. Unrecognized errors in transcription may be present.         +

## 2021-07-23 ENCOUNTER — HOSPITAL ENCOUNTER (OUTPATIENT)
Dept: ULTRASOUND IMAGING | Age: 51
Discharge: HOME | End: 2021-07-23
Payer: COMMERCIAL

## 2021-07-23 DIAGNOSIS — N50.82 SCROTAL PAIN: ICD-10-CM

## 2021-07-23 PROCEDURE — 93975 VASCULAR STUDY: CPT

## 2021-07-23 PROCEDURE — 76870 US EXAM SCROTUM: CPT

## 2021-08-09 NOTE — PROGRESS NOTES
Chief complaint   Chief Complaint   Patient presents with    Leg Pain     right    Hip Pain     right       History of Present Illness:  Herminia Oh is a  44 y.o.  male comes in today as a new patient. He states that about 2 and half weeks ago he started with right-sided buttock pain that radiates down to his foot with numbness and tingling. He states it can get so bad it causes him to drop to the ground. He has a history of left-sided sciatica and has had left L5 5 S1 selective nerve root block by Dr. Zander Cool at [facility] pain management. I did find a record from July 2015. He states he has had some since then also. He has not had right-sided back pain or leg pain in the past.  He is at [facility] pain management and he is on hydromorphone 4 mg gabapentin 300 mg 3 times a day. Additionally he takes Zanaflex and ibuprofen. He works as an instructor. He is a non-smoker. He states he even went to the chiropractor twice but it really did not help. He denies fever bowel bladder dysfunction. 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 He has a full weightbearing nonantalgic gait. He has 4 out of 5 strength bilateral lower extremities. Negative straight leg raise. He has some pain with hyperextension lumbar spine. Assessment and Plan: This is a patient who has right-sided sciatica. We did a lumbar AP and lateral x-ray. It does show a grade 1-2 spondylolisthesis at L5-S1. He has had this on prior x-rays but it was more definitely grade 1. I will give him a steroid taper to see if we can calm this down.   He is asking about injections like his head on the left side but I explained to him he would have to get MRI prior to us doing that. In order to get an MRI he would have to do physical therapy. He will try the steroids first.  If that does not help he will let us know and we will put him in physical therapy. We will see him back as needed. XRAY: 08/10/21   body part: Lumbar  side (rt/lt): bilateral  number of views taken:2  Findings: L5-S1 spondylolisthesis    The x-ray will be officially read by Dr. Alberto Guillen and scanned into the chart. Medications:  Current Outpatient Medications   Medication Sig Dispense Refill    predniSONE (DELTASONE) 20 mg tablet Take 3 tabs po x 3 days, then 2 tabs po x 3 days, then 1 tabs po x 3 days, then 1/2 tab po x 4 day 20 Tablet 0    gabapentin (NEURONTIN) 300 mg capsule TAKE 1 CAPSULE BY MOUTH THREE TIMES A DAY      omeprazole (PRILOSEC) 20 mg capsule Take 20 mg by mouth daily as needed.  tamsulosin (Flomax) 0.4 mg capsule Take 1 Cap by mouth daily. 90 Cap 3    fluticasone propionate (FLONASE) 50 mcg/actuation nasal spray 2 sprays in each nostril daily x1 week then 1-2 sprays in each nostril daily. (use smallest dose possible for symptom control after week 1).  nystatin-triamcinolone (MYCOLOG II) topical cream nystatin-triamcinolone 100,000 unit/g-0.1 % topical cream      cetirizine (ZYRTEC) 10 mg tablet Take 10 mg by mouth daily as needed.  rizatriptan (MAXALT-MLT) 10 mg disintegrating tablet Take 10 mg by mouth once as needed for Migraine.  lidocaine (LIDODERM) 5 %(700 mg/patch) 1 Patch by TransDERmal route every twenty-four (24) hours.  oxybutynin (DITROPAN) 5 mg tablet Take 1 Tab by mouth daily. 30 Tab 6    HYDROmorphone (DILAUDID) 8 mg tablet Take 8 mg by mouth every four (4) hours as needed.  tiZANidine (ZANAFLEX) 4 mg capsule Take 4 mg by mouth three (3) times daily as needed.  ibuprofen (MOTRIN) 800 mg tablet Take 800 mg by mouth every eight (8) hours as needed.       zolpidem CR (AMBIEN CR) 12.5 mg tablet Take 12.5 mg by mouth nightly as needed.  promethazine (PHENERGAN) 25 mg tablet Take 25 mg by mouth every six (6) hours as needed.  oxybutynin (DITROPAN) 5 mg tablet Take 1 Tab by mouth three (3) times daily. 90 Tab 5    nadolol (CORGARD) 20 mg tablet Take 20 mg by mouth every other day. Review of systems:    Past Medical History:   Diagnosis Date    Back problem     Burning with urination     Epilepsy ([facility] [address]     as child only    Headache(784.0)     Heartburn     Joint pain     Trouble in sleeping      Past Surgical History:   Procedure Laterality Date    HX ORTHOPAEDIC      left shoulder surgery    HX ORTHOPAEDIC      right knee surgery     Social History     Socioeconomic History    Marital status: SINGLE     Spouse name: Not on file    Number of children: Not on file    Years of education: Not on file    Highest education level: Not on file   Occupational History    Not on file   Tobacco Use    Smoking status: Never Smoker    Smokeless tobacco: Never Used   Vaping Use    Vaping Use: Never used   Substance and Sexual Activity    Alcohol use: Yes    Drug use: No    Sexual activity: Never   Other Topics Concern    Not on file   Social History Narrative    Not on file     Social Determinants of Health     Financial Resource Strain:     Difficulty of Paying Living Expenses:    Food Insecurity:     Worried About Running Out of Food in the Last Year:     [address] in the Last Year:    Transportation Needs:     Lack of Transportation (Medical):      Lack of Transportation (Non-Medical):    Physical Activity:     Days of Exercise per Week:     Minutes of Exercise per Session:    Stress:     Feeling of Stress :    Social Connections:     Frequency of Communication with Friends and Family:     Frequency of Social Gatherings with Friends and Family:     Attends [employer] Services:     Active Member of Clubs or Organizations:     Attends Club or Organization Meetings:     Marital Status:    Intimate Partner Violence:     Fear of Current or Ex-Partner:     Emotionally Abused:     Physically Abused:     Sexually Abused:      Family History   Problem Relation Age of Onset    Diabetes Mother     Hypertension Mother        Physical Exam:  Visit Vitals  Pulse (!) 48 Comment: pt asymptomatic, NP aware, pt states normal   Temp 97.8 °F (36.6 °C) (Skin)   Ht 5' 10\" (1.778 m)   Wt 213 lb (96.6 kg)   SpO2 96% Comment: RA   BMI 30.56 kg/m²     Pain Scale: 7/10    LPMP has been . reviewed and is appropriate        Diagnoses and all orders for this visit:    1. Chronic right-sided low back pain with right-sided sciatica  -     AMB POC XRAY, SPINE, LUMBOSACRAL; 2 O  -     predniSONE (DELTASONE) 20 mg tablet; Take 3 tabs po x 3 days, then 2 tabs po x 3 days, then 1 tabs po x 3 days, then 1/2 tab po x 4 day    2. Spondylolisthesis, lumbar region            Follow-up and Dispositions    · Return if symptoms worsen or fail to improve.              We have informed Amilcarsultana Nancy to notify us for immediate appointment if he has any worsening neurogical symptoms or if an emergency situation presents, then call 911

## 2021-08-10 ENCOUNTER — OFFICE VISIT (OUTPATIENT)
Dept: ORTHOPEDIC SURGERY | Age: 51
End: 2021-08-10
Payer: COMMERCIAL

## 2021-08-10 VITALS
HEIGHT: 70 IN | WEIGHT: 213 LBS | BODY MASS INDEX: 30.49 KG/M2 | OXYGEN SATURATION: 96 % | TEMPERATURE: 97.8 F | HEART RATE: 48 BPM

## 2021-08-10 DIAGNOSIS — M43.16 SPONDYLOLISTHESIS, LUMBAR REGION: ICD-10-CM

## 2021-08-10 DIAGNOSIS — M54.41 CHRONIC RIGHT-SIDED LOW BACK PAIN WITH RIGHT-SIDED SCIATICA: Primary | ICD-10-CM

## 2021-08-10 DIAGNOSIS — G89.29 CHRONIC RIGHT-SIDED LOW BACK PAIN WITH RIGHT-SIDED SCIATICA: Primary | ICD-10-CM

## 2021-08-10 PROCEDURE — 72100 X-RAY EXAM L-S SPINE 2/3 VWS: CPT | Performed by: NURSE PRACTITIONER

## 2021-08-10 PROCEDURE — 99203 OFFICE O/P NEW LOW 30 MIN: CPT | Performed by: NURSE PRACTITIONER

## 2021-08-10 RX ORDER — PREDNISONE 20 MG/1
TABLET ORAL
Qty: 20 TABLET | Refills: 0 | Status: SHIPPED | OUTPATIENT
Start: 2021-08-10

## 2021-08-10 NOTE — PROGRESS NOTES
Ced Groves presents today for   Chief Complaint   Patient presents with    Leg Pain     right    Hip Pain     right       Is someone accompanying this pt? no    Is the patient using any DME equipment during OV? no    Learning Assessment:  Learning Assessment 11/30/2012   PRIMARY LEARNER Patient   BARRIERS PRIMARY LEARNER NONE   PRIMARY LANGUAGE ENGLISH   LEARNER PREFERENCE PRIMARY READING   ANSWERED BY patient   RELATIONSHIP SELF       Coordination of Care:  1. Have you been to the ER, urgent care clinic since your last visit? no  Hospitalized since your last visit? no    2. Have you seen or consulted any other health care providers outside of the [address] [place] [address] since your last visit? Yes, pcp, urology and pain management. Include any pap smears or colon screening.  no

## 2021-08-23 ENCOUNTER — TELEPHONE (OUTPATIENT)
Dept: ORTHOPEDIC SURGERY | Age: 51
End: 2021-08-23

## 2021-08-23 NOTE — TELEPHONE ENCOUNTER
Per my last OV note:  I explained to him he would have to get MRI prior to us doing that. In order to get an MRI he would have to do physical therapy. He will try the steroids first.  If that does not help he will let us know and we will put him in physical therapy    Does he want me to order the PT. He also needs to be on a NSAID if not already on one.

## 2021-08-23 NOTE — TELEPHONE ENCOUNTER
Patient called for MAXIMUS Oh. Patient said that last week he had spoken through My Chart with MAXIMUS Oh . Patient said he was not aware that he was scheduled for an appt to see MAXIMUS Oh on 8/16/2021 for his Sciatica pain. That he missed the appt because he was not aware he had one scheduled. Patient would like to know if MAXIMUS Huang could put in an order for him to get an Mri done , or if MAXIMUS Oh needs to see him first.     Patient would like a call back at either   Tel. [phone]. Or to call his Cell # [phone] and leave him a message.

## 2021-08-23 NOTE — TELEPHONE ENCOUNTER
Patient called again and said he does not want Physical Therapy. Patient states that the last time he spoke with the NP , he was told that she would need to see him first.     Patient is requesting to have the mri and no Physical Therapy at all. That he wants to get the mri done. Patient said that the NP wanted to Re exam his leg. Patient said that he can come to the office if needed, but he would like the mri done. Patient tel. [phone]. Note: patient was offered a VV appt. But the patient said he does not think the NP can evaluated his leg in a VV appt.

## 2021-08-23 NOTE — TELEPHONE ENCOUNTER
When patient was transferred to the front to make the appointment with Dr. Zoila Gilbert for tomorrow, [facility] states that she offered him several appointments for this week and next and he declined all. Stated he would call back.

## 2021-08-23 NOTE — TELEPHONE ENCOUNTER
Spoke with patient and explained that in order to get an MRI ordered he would need to come in to be examined and it be determined that he has leg weakness, then we can submit to [facility] to try and get approval for MRI w/o doing 6 weeks of PT.  Appoinment made for 08- with Dr. Reji Figueroa

## 2021-08-27 ENCOUNTER — HOSPITAL ENCOUNTER (OUTPATIENT)
Dept: PHYSICAL THERAPY | Age: 51
Discharge: HOME | End: 2021-08-27
Payer: COMMERCIAL

## 2021-08-27 PROCEDURE — 97535 SELF CARE MNGMENT TRAINING: CPT

## 2021-08-27 PROCEDURE — 97162 PT EVAL MOD COMPLEX 30 MIN: CPT

## 2021-08-27 PROCEDURE — 97112 NEUROMUSCULAR REEDUCATION: CPT

## 2021-08-27 PROCEDURE — 97110 THERAPEUTIC EXERCISES: CPT

## 2021-08-27 NOTE — PROGRESS NOTES
PT DAILY TREATMENT NOTE     Patient Name: Homer Pham  Date:2021  : 1970  [x]  Patient  Verified  Payor: BLUE CROSS / Plan: [facility] / Product Type: PPO /    In time:1205pm  Out time:1255pm  Total Treatment Time (min): 50  Visit #: 1 of 8    Medicare/BCBS Only   Total Timed Codes (min):  12 1:1 Treatment Time:  50       Treatment Area: Acute right-sided low back pain [M54.5]    SUBJECTIVE  Pain Level (0-10 scale): 7  Any medication changes, allergies to medications, adverse drug reactions, diagnosis change, or new procedure performed?: [x] No    [] Yes (see summary sheet for update)  Subjective functional status/changes:   [] No changes reported  See paper evaluation and POC. OBJECTIVE    12 min [x]Eval                  []Re-Eval       15 min Therapeutic Exercise:  [] See flow sheet :   Rationale: increase ROM, increase strength and increase proprioception to improve the patients ability to manage ADLs. 15 min Self care/home management:  []  See flow sheet : pt education regarding relevant anatomy, pathology, and treatment strategies as it relates to self care. Discussion of general centralization techniques. Rationale: increase ROM and improve understanding  to improve the patients ability to manage self care. 8 min Neuromuscular Re-education:  []  See flow sheet : repeated flexion in sitting, standing, and flexion with deep squats for centralization. Rationale: decrease pain  to improve the patients ability to manage self care and ADLs.            With   [] TE   [] TA   [] neuro   [] other: Patient Education: [x] Review HEP    [] Progressed/Changed HEP based on:   [] positioning   [] body mechanics   [] transfers   [] heat/ice application    [] other:    Other Objective/Functional Measures: see paper eval and POC     Pain Level (0-10 scale) post treatment: 7     ASSESSMENT/Changes in Function: Pt is a 58-year-old male presenting to the clinic with c/o right sided LBP, right buttock pain, with radiation anteriorly > posteriorly from superior thigh to the foot. Pain described as \"excruciating,\" sharp, and numbness and tingling. This pain began 1 month ago insidiously. Pt previously has had chronic issues with left sided LBP due to a ten year history of L5/S1 spondylolisthesis, but reports this is the worst pain he has had and the first time on his right LE. Pain increases with standing (\"leg goes numb\"), start up pain with walking, laying prone, and with lumbar extension. Pt has a flexion preference, with main pain relief achieved in a deep squat with B knees to chest. Posterior chain tightness and weakness is evident. However, there is also fair tightness of the right hip flexors, with immediate peripheralization of symptoms with Manoj stretching, and anterior chain weakness. Palpation of the iliopsoas increased anterior thigh numbness and tingling. B SIJ shows no evidence of malalignment today. Additional impairments are noted today with decreased lumbar mobility, decreased strength, decreased postural awareness, reduced flexibility, reduced lumbar ROM into extension and B sidebending, reduced ease of ADLs, and reduced participation in golfing. Signs and symptoms are consistent with mechanical LBP with soft tissue restrictions likely increasing peripheral pain. Pt will benefit from skilled PT services to address the aforementioned impairments. Patient will continue to benefit from skilled PT services to modify and progress therapeutic interventions, address functional mobility deficits, address ROM deficits, address strength deficits, analyze and address soft tissue restrictions, analyze and cue movement patterns, analyze and modify body mechanics/ergonomics, assess and modify postural abnormalities, address imbalance/dizziness and instruct in home and community integration to attain remaining goals.      [x]  See Plan of Care  []  See progress note/recertification  []  See Discharge Summary         Progress towards goals / Updated goals:  Short Term Goals: To be accomplished in 2 weeks:  1. Pt will report compliance with his HEP to maximize therapeutic success. Eval: HEP assigned  2. Pt will report no > 6/10 pain on average, indicating improved centralization of pain. Eval: 7-12/10  Long Term Goals: To be accomplished in 4 weeks:  1. Pt will improve his FOTO score to 63, demonstrating improved functional capacity for ADLs. Eval: 45  2. Pt will demonstrate WNL lumbar ROM to improve ease of self care. Eval: 25% extension and right sidebend with pain, 75% all other motions  3. Pt will improve his B hip strength to grossly MMT 4+/5 to improve ease of ADLs and recreation. Eval: right hip flexors 3/5, glute med 4-/5, glute max 2+/5, left glute med/hip flexot 4/5, left glute max 3/5  4. Pt will improve his B quadriceps and HS strength to 5-/5 MMT to improve ease and comfort of ambulation. Eval: left quads/HS 3+/5, right hS 4/5  5. Pt will demonstrate negative peripheralization with modified Manoj stretching of the right LE, indicating improved soft tissue extensibility and reduced pain. Eval: (+) increased numbness and tingling  6. Pt will report </= 3/10 pain with STS and ambulation, indicating improved pain for QOL.     Eval: 7-12/10    PLAN  []  Upgrade activities as tolerated     [x]  Continue plan of care  []  Update interventions per flow sheet       []  Discharge due to:_  []  Other:_      Penny Sen, PT 8/27/2021  1:11 PM    Future Appointments   Date Time Provider Herminia Henderson   9/1/2021  4:30 PM Robert Sosa, PT [facility]   9/8/2021  5:15 PM Doy Caul [facility]   9/14/2021  4:30 PM Gonzalez Jessica, PTA [facility]   9/16/2021  5:15 PM Osmar Castellano, PT [facility]   9/20/2021  4:30 PM Yohannes Marcelino [facility]   9/22/2021  4:30 PM Gonzalez Jessica, PTA [facility]   3/11/2022  3:40 PM [facility] [address]   3/18/2022  4:00 PM Vicenta Orr MD [facility]

## 2021-08-27 NOTE — PROGRESS NOTES
[facility]  [address]  [place], [address]  [phone] fax    Plan of Care/ Statement of Necessity for Physical Therapy Services    Patient name: Ruth Ann Stearns Start of Care: 2021   Referral source: Radha Pickard NP : 1970    Medical Diagnosis: Acute right-sided low back pain [M54.5]  Payor: BLUE CROSS / Plan:  [facility] [place] / Product Type: PPO /  Onset Date:1 mo    Treatment Diagnosis: LBP, right LE pain   Prior Hospitalization: see medical history Provider#: [id number]   Medications: Verified on Patient summary List    Comorbidities: arthritis, back pain, headaches, prostate problems with scrotal pain, sleep dysfunction, L5/S1 spondylolisthesis    Prior Level of Function: Previously had occasional bouts of left sided LBP. Pt reports he has been active with walking, going to the gym, and golfing. The Plan of Care and following information is based on the information from the initial evaluation. Assessment/ key information: Pt is a 57-year-old male presenting to the clinic with c/o right sided LBP, right buttock pain, with radiation anteriorly > posteriorly from superior thigh to the foot. Pain described as \"excruciating,\" sharp, and numbness and tingling. This pain began 1 month ago insidiously. Pt previously has had chronic issues with left sided LBP due to a ten year history of L5/S1 spondylolisthesis, but reports this is the worst pain he has had and the first time on his right LE. Pain increases with standing (\"leg goes numb\"), start up pain with walking, laying prone, and with lumbar extension. Pt has a flexion preference, with main pain relief achieved in a deep squat with B knees to chest. Posterior chain tightness and weakness is evident. However, there is also fair tightness of the right hip flexors, with immediate peripheralization of symptoms with Manoj stretching, and anterior chain weakness.  Palpation of the iliopsoas increased anterior thigh numbness and tingling. B SIJ shows no evidence of malalignment today. Additional impairments are noted today with decreased lumbar mobility, decreased strength, decreased postural awareness, reduced flexibility, reduced lumbar ROM into extension and B sidebending, reduced ease of ADLs, and reduced participation in golfing. Signs and symptoms are consistent with mechanical LBP with soft tissue restrictions likely increasing peripheral pain. Pt will benefit from skilled PT services to address the aforementioned impairments. Evaluation Complexity History HIGH Complexity :3+ comorbidities / personal factors will impact the outcome/ POC ; Examination HIGH Complexity : 4+ Standardized tests and measures addressing body structure, function, activity limitation and / or participation in recreation  ;Presentation MEDIUM Complexity : Evolving with changing characteristics  ; Clinical Decision Making MEDIUM Complexity : FOTO score of 26-74  Overall Complexity Rating: MEDIUM  Problem List: pain affecting function, decrease ROM, decrease strength, impaired gait/ balance, decrease ADL/ functional abilitiies, decrease activity tolerance, decrease flexibility/ joint mobility and decrease transfer abilities   Treatment Plan may include any combination of the following: Therapeutic exercise, Therapeutic activities, Neuromuscular re-education, Physical agent/modality, Gait/balance training, Manual therapy, Patient education, Self Care training, Functional mobility training, Home safety training and Stair training  Patient / Family readiness to learn indicated by: asking questions, trying to perform skills and interest  Persons(s) to be included in education: patient (P)  Barriers to Learning/Limitations: None  Patient Goal (s): eliminate pain and numbness  Patient Self Reported Health Status: good  Rehabilitation Potential: fair    Short Term Goals: To be accomplished in 2 weeks:  1.  Pt will report compliance with his HEP to maximize therapeutic success. 2. Pt will report no > 6/10 pain on average, indicating improved centralization of pain. Long Term Goals: To be accomplished in 4 weeks:  1. Pt will improve his FOTO score to 63, demonstrating improved functional capacity for ADLs. 2. Pt will demonstrate WNL lumbar ROM to improve ease of self care. 3. Pt will improve his B hip strength to grossly MMT 4+/5 to improve ease of ADLs and recreation. 4. Pt will improve his B quadriceps and HS strength to 5-/5 MMT to improve ease and comfort of ambulation. 5. Pt will demonstrate negative peripheralization with modified Manoj stretching of the right LE, indicating improved soft tissue extensibility and reduced pain. 6. Pt will report </= 3/10 pain with STS and ambulation, indicating improved pain for QOL. Frequency / Duration: Patient to be seen 2 times per week for 4 weeks. Patient/ Caregiver education and instruction: Diagnosis, prognosis, self care, activity modification and exercises   [x]  Plan of care has been reviewed with MATTHEW Duarte, PT 8/27/2021 1:59 PM    ________________________________________________________________________    I certify that the above Therapy Services are being furnished while the patient is under my care. I agree with the treatment plan and certify that this therapy is necessary.     [de-identified] Signature:____________Date:_________TIME:________     Kelly Landa NP  ** Signature, Date and Time must be completed for valid certification **    Please sign and return to [facility] [address]  [address]  [place], [address]  [phone] fax

## 2021-09-01 ENCOUNTER — HOSPITAL ENCOUNTER (OUTPATIENT)
Dept: PHYSICAL THERAPY | Age: 51
Discharge: HOME | End: 2021-09-01
Payer: COMMERCIAL

## 2021-09-01 PROCEDURE — 97140 MANUAL THERAPY 1/> REGIONS: CPT

## 2021-09-01 PROCEDURE — 97112 NEUROMUSCULAR REEDUCATION: CPT

## 2021-09-01 PROCEDURE — 97110 THERAPEUTIC EXERCISES: CPT

## 2021-09-01 NOTE — PROGRESS NOTES
PT DAILY TREATMENT NOTE     Patient Name: Verner Catchings  Date:2021  : 1970  [x]  Patient  Verified  Payor: BLUE CROSS / Plan: [address] [place] [address] [place] / Product Type: PPO /    In time:430PM  Out time:530PM  Total Treatment Time (min): 60  Visit #: 2 of 8    Medicare/BCBS Only   Total Timed Codes (min):  60 1:1 Treatment Time:  45       Treatment Area: Acute right-sided low back pain [M54.5]    SUBJECTIVE  Pain Level (0-10 scale): 7 with meds  Any medication changes, allergies to medications, adverse drug reactions, diagnosis change, or new procedure performed?: [x] No    [] Yes (see summary sheet for update)  Subjective functional status/changes:   [] No changes reported  Pt reports pain is no better but the flexion activities help to ease the episodes at work. OBJECTIVE    Modality rationale: decrease pain to improve the patients ability to manage self care. Min Type Additional Details    [] Estim:  []Unatt       []IFC  []Premod                        []Other:  []w/ice   []w/heat  Position:  Location:   10 [x]  Ice     []  heat  []  Ice massage  []  Laser   []  Anodyne Position: supine with wedge  Location: right hip flexor and low back   [] Skin assessment post-treatment:  []intact []redness- no adverse reaction    []redness  adverse reaction:     20 min Therapeutic Exercise:  [x] See flow sheet :   Rationale: increase ROM and increase strength to improve the patients ability to perform ADLs with improved ease and reduced pain. 15 min Neuromuscular Re-education:  [x]  See flow sheet :   Rationale: increase strength and improve coordination  to improve the patients ability to perform functional activities with reduced peripheralization of pain and improved ease. 10 min Manual Therapy: In supine, using  performed STM to the right hip flexor.  In left S/L, performed deep tissue massage to the gluteus medius followed by STM with the  to the glutes and piriformis. In supine again, performed right LE long axis distraction hold x 60 sec followed by BLE distraction. Neurological symptoms decreased with LE and with lumbar distraction performed manually. The manual therapy interventions were performed at a separate and distinct time from the therapeutic activities interventions. Rationale: decrease pain, increase ROM and increase tissue extensibility to improve ease of ADL management. With   [] TE   [] TA   [] neuro   [] other: Patient Education: [x] Review HEP    [] Progressed/Changed HEP based on:   [] positioning   [] body mechanics   [] transfers   [] heat/ice application    [] other:      Other Objective/Functional Measures: exercises initiated per POC. Pain Level (0-10 scale) post treatment: 2-3 in the buttock, numbness and tingling down the anterior thigh. ASSESSMENT/Changes in Function: Pt is able to perform all exercises without increase in peripheral symptoms. He does c/o increased hip flexor pain with assisted ely stretching and ena stretching, with significant hip flexor tightness. Performed a manual traction screen with the pt today, with him reporting reduced peripheral symptoms. Discussed utilizing mechanical lumbar traction at his next visit with therapist monitoring sx to ensure sx do no worsen, but based on manual therapy today, he is anticipated to do well with it. Patient will continue to benefit from skilled PT services to modify and progress therapeutic interventions, address functional mobility deficits, address ROM deficits, address strength deficits, analyze and address soft tissue restrictions, analyze and cue movement patterns, analyze and modify body mechanics/ergonomics, assess and modify postural abnormalities and instruct in home and community integration to attain remaining goals.      []  See Plan of Care  []  See progress note/recertification  []  See Discharge Summary         Progress towards goals / Updated goals:  Short Term Goals: To be accomplished in 2 weeks:  1. Pt will report compliance with his HEP to maximize therapeutic success. Eval: HEP assigned   Current: progressing, reports initiation of HEP forward flexion stretching and HS stretching at work. (9/1/2021)  2. Pt will report no > 6/10 pain on average, indicating improved centralization of pain. Eval: 7-12/10   Current: remains, about the same, 7/10 with radiation down the anterior and lateral thigh and \"sharp\" pain in the right buttock. (9/1/2021)  Long Term Goals: To be accomplished in 4 weeks:  1. Pt will improve his FOTO score to 63, demonstrating improved functional capacity for ADLs. Eval: 45  2. Pt will demonstrate WNL lumbar ROM to improve ease of self care. Eval: 25% extension and right sidebend with pain, 75% all other motions  3. Pt will improve his B hip strength to grossly MMT 4+/5 to improve ease of ADLs and recreation. Eval: right hip flexors 3/5, glute med 4-/5, glute max 2+/5, left glute med/hip flexot 4/5, left glute max 3/5  4. Pt will improve his B quadriceps and HS strength to 5-/5 MMT to improve ease and comfort of ambulation. Eval: left quads/HS 3+/5, right hS 4/5  5. Pt will demonstrate negative peripheralization with modified Manoj stretching of the right LE, indicating improved soft tissue extensibility and reduced pain. Eval: (+) increased numbness and tingling  6. Pt will report </= 3/10 pain with STS and ambulation, indicating improved pain for QOL.                Eval: 7-12/10    PLAN  []  Upgrade activities as tolerated     [x]  Continue plan of care  []  Update interventions per flow sheet       []  Discharge due to:_  []  Other:_      Akiko Zaman, PT 9/1/2021  4:41 PM    Future Appointments   Date Time Provider Herminia Henderson   9/8/2021  5:15 PM Du Nestor [facility]   9/14/2021  4:30 PM Manuela, Jewel Earing, PTA [facility]   9/16/2021  5:15 PM Maci WILL, PT [facility]   9/20/2021  4:30 PM Peg Hernandez [facility]   9/22/2021  4:30 PM Zak Salgado PTA [facility]   3/11/2022  3:40 PM [facility]   3/18/2022  4:00 PM MD Keyanna Eller

## 2021-09-03 ENCOUNTER — APPOINTMENT (OUTPATIENT)
Dept: PHYSICAL THERAPY | Age: 51
End: 2021-09-03
Payer: COMMERCIAL

## 2021-09-08 ENCOUNTER — HOSPITAL ENCOUNTER (OUTPATIENT)
Dept: PHYSICAL THERAPY | Age: 51
Discharge: HOME | End: 2021-09-08
Payer: COMMERCIAL

## 2021-09-08 PROCEDURE — 97012 MECHANICAL TRACTION THERAPY: CPT

## 2021-09-08 PROCEDURE — 97110 THERAPEUTIC EXERCISES: CPT

## 2021-09-08 PROCEDURE — 97112 NEUROMUSCULAR REEDUCATION: CPT

## 2021-09-08 PROCEDURE — 97140 MANUAL THERAPY 1/> REGIONS: CPT

## 2021-09-08 NOTE — PROGRESS NOTES
PT DAILY TREATMENT NOTE     Patient Name: Herminia Oh  Date:2021  : 1970  [x]  Patient  Verified  Payor: BLUE CROSS / Plan: [employer] [facility] / Product Type: PPO /    In time:513PM  Out time:612PM  Total Treatment Time (min): 59  Visit #: 3 of 8    Medicare/BCBS Only   Total Timed Codes (min):  59 1:1 Treatment Time:  45       Treatment Area: Acute right-sided low back pain [M54.5]    SUBJECTIVE  Pain Level (0-10 scale): 5-6  Any medication changes, allergies to medications, adverse drug reactions, diagnosis change, or new procedure performed?: [x] No    [] Yes (see summary sheet for update)  Subjective functional status/changes:   [] No changes reported  Pt reports some improvement in overall pain since his last visit. He did take a break from his HEP over the weekend to help with his mom's house, which did increase some of his pain but it is still lower than last week. Tingling does persist in the anterior right thigh with pain at the right buttock. OBJECTIVE    Modality rationale: decrease pain and increase tissue extensibility to improve the patients ability to manage ADLs and self care. Min Type Additional Details   8 min (4 min set up) [x]  Traction: [] Cervical       [x]Lumbar                       [] Prone          [x]Supine                       []Intermittent   [x]Continuous Lbs: 85#  [] before manual  [x] after manual   [] Cold pack for 5 minutes to the low back with the pt in sitting. Skin assessment post-treatment:  []intact []redness- no adverse reaction    []redness  adverse reaction:     22 min Therapeutic Exercise:  [x] See flow sheet :   Rationale: increase ROM and increase strength to improve the patients ability to perform ADLs with improved ease and reduced pain.       15 min Neuromuscular Re-education:  [x]  See flow sheet :   Rationale: increase strength and improve coordination  to improve the patients ability to perform functional activities with reduced peripheralization of pain and improved ease. 8 min Manual Therapy:  Pt in left S/L -- DTM and TPR to the glute max and STM to the right QL. The manual therapy interventions were performed at a separate and distinct time from the therapeutic activities interventions. Rationale: decrease pain, increase ROM and increase tissue extensibility to improve ease of ADL management. With   [] TE   [] TA   [] neuro   [] other: Patient Education: [x] Review HEP    [] Progressed/Changed HEP based on:   [] positioning   [] body mechanics   [] transfers   [] heat/ice application    [] other:      Other Objective/Functional Measures: mechanical lumbar traction administered for first f/u. Pain Level (0-10 scale) post treatment: 2-3    ASSESSMENT/Changes in Function: Traction was initiated today but the straps were not tight enough so the pelvic girlde slid down the hips with lumbar traction, putting extra discomfort on the QL so traction was discontinued. He did report improved tingling, however. Cold pack was administered for just 5 minutes due to pt time constraints to reduce soft tissue soreness from the straps that slid down. Pt demonstrating good response to the gentle therEx, manual therapy, and lumbar traction program thus far and is encouraged to continue his HEP daily. ADDENDUM: on 9/13/2021, pt called to say that mechanical TX strained his muscle. Will only do manual traction and gentle STM moving forward. Patient will continue to benefit from skilled PT services to modify and progress therapeutic interventions, address functional mobility deficits, address ROM deficits, address strength deficits, analyze and address soft tissue restrictions, analyze and cue movement patterns, analyze and modify body mechanics/ergonomics, assess and modify postural abnormalities, address imbalance/dizziness and instruct in home and community integration to attain remaining goals.      []  See Plan of Care  []  See progress note/recertification  []  See Discharge Summary         Progress towards goals / Updated goals:  Short Term Goals: To be accomplished in 2 weeks:  1. Pt will report compliance with his HEP to maximize therapeutic success.              Eval: HEP assigned              Current: progressing, reports initiation of HEP forward flexion stretching and HS stretching at work. (9/1/2021)  2. Pt will report no > 6/10 pain on average, indicating improved centralization of pain.              Eval: 7-12/10              Current: remains, about the same, 7/10 with radiation down the anterior and lateral thigh and \"sharp\" pain in the right buttock. (9/1/2021)  Long Term Goals: To be accomplished in 4 weeks:  1. Pt will improve his FOTO score to 63, demonstrating improved functional capacity for ADLs.             Eval: 45  2. Pt will demonstrate WNL lumbar ROM to improve ease of self care.              Eval: 25% extension and right sidebend with pain, 75% all other motions  3. Pt will improve his B hip strength to grossly MMT 4+/5 to improve ease of ADLs and recreation.              Eval: right hip flexors 3/5, glute med 4-/5, glute max 2+/5, left glute med/hip flexot 4/5, left glute max 3/5  4. Pt will improve his B quadriceps and HS strength to 5-/5 MMT to improve ease and comfort of ambulation.              Eval: left quads/HS 3+/5, right hS 4/5  5. Pt will demonstrate negative peripheralization with modified Manoj stretching of the right LE, indicating improved soft tissue extensibility and reduced pain.              Eval: (+) increased numbness and tingling    Current: progressing, a little tingling but improved from her last visit. (9/8/2021)  6. Pt will report </= 3/10 pain with STS and ambulation, indicating improved pain for QOL.                Eval: 7-12/10    PLAN  []  Upgrade activities as tolerated     [x]  Continue plan of care   []  Update interventions per flow sheet       []  Discharge due to:_  []  Other:_      Ihsan Moss, PT 9/8/2021  5:23 PM    Future Appointments   Date Time Provider Herminia Beatriz   9/14/2021  4:30 PM Lauro Rosado, MATTHEW [facility]   9/16/2021  5:15 PM Sarah WILL, PT [facility]   9/20/2021  4:30 PM Niranjan Leigh [facility]   9/22/2021  4:30 PM Lauro Rosado PTA [facility]   3/11/2022  3:40 PM [facility] NURSE [facility]   3/18/2022  4:00 PM Haresh Gillis MD [address] [facility]

## 2021-09-14 ENCOUNTER — APPOINTMENT (OUTPATIENT)
Dept: PHYSICAL THERAPY | Age: 51
End: 2021-09-14
Payer: COMMERCIAL

## 2021-09-16 ENCOUNTER — HOSPITAL ENCOUNTER (OUTPATIENT)
Dept: PHYSICAL THERAPY | Age: 51
Discharge: HOME | End: 2021-09-16
Payer: COMMERCIAL

## 2021-09-16 PROCEDURE — 97140 MANUAL THERAPY 1/> REGIONS: CPT

## 2021-09-16 PROCEDURE — 97110 THERAPEUTIC EXERCISES: CPT

## 2021-09-16 PROCEDURE — 97112 NEUROMUSCULAR REEDUCATION: CPT

## 2021-09-16 NOTE — PROGRESS NOTES
PT DAILY TREATMENT NOTE     Patient Name: Verner Catchings  Date:2021  : 1970  [x]  Patient  Verified  Payor: BLUE CROSS / Plan: [address] [place] [address] [place] / Product Type: PPO /    In time:5:15  Out time: 6:20  Total Treatment Time (min): 65  Visit #: 4 of 8    Medicare/BCBS Only   Total Timed Codes (min):  55 1:1 Treatment Time:  55       Treatment Area: Acute right-sided low back pain [M54.5]    SUBJECTIVE   Pain Level (0-10 scale): 5/10  Any medication changes, allergies to medications, adverse drug reactions, diagnosis change, or new procedure performed?: [x] No    [] Yes (see summary sheet for update)  Subjective functional status/changes:   [] No changes reported  The patient indicated upon arrival that his groin area was in pain after last session, as the last therapist \"put the traction on wrong\". He stated he had trouble even getting up off of traction for a few minutes. He does report marked improvement in that area since that time and states that his chief complaint currently is right leg numbbess down the front of his leg into his ankle but not toes. He states he had an injection in the SIJ region, and the plan is to do an additional injection next week into L4 region. OBJECTIVE   32 min Therapeutic Exercise:  [x] See flow sheet :   Rationale: increase ROM and increase strength to improve the patients ability to improve ADL ease. 15 min Neuromuscular Re-education:  [x]  See flow sheet :   Rationale: increase ROM, increase strength and improve coordination  to improve the patients ability to improve ADL ease. 8 min Manual Therapy:  Prone position right LE traction performed     The manual therapy interventions were performed at a separate and distinct time from the therapeutic activities interventions. Rationale: decrease pain, increase ROM and increase tissue extensibility to improve ADL ease.      Modality rationale: decrease edema, decrease inflammation and decrease pain to improve the patients ability to improve ADL ease. Min Type Additional Details   10 [x]  Ice     []  heat  []  Ice massage  []  Laser   []  Anodyne Position: seated  Location: lumbar spine   [] Skin assessment post-treatment:  []intact []redness- no adverse reaction    []redness  adverse reaction:     With   [] TE   [] TA   [] neuro   [] other: Patient Education: [x] Review HEP    [] Progressed/Changed HEP based on:   [] positioning   [] body mechanics   [] transfers   [] heat/ice application    [] other:      Other Objective/Functional Measures:   Hamstring flexibility: 30 degrees right  Quick to fatigue concerning right glute med and glute max. Cues required to attain good posterior pelvic tilt    Left hip positive with IR and flexion, as well as flexion into ER not probable arthritic component of left hip/groin pain, though this did not impact the session. Pain Level (0-10 scale) post treatment: 2/10    ASSESSMENT/Changes in Function: The patient fully participated in session without increase in pain throughout. He required cues in order to mitigate lumbar lordotic posturing, but responded well to cues. He left with good pain control. Patient will continue to benefit from skilled PT services to modify and progress therapeutic interventions, address functional mobility deficits, address ROM deficits, address strength deficits, analyze and address soft tissue restrictions, analyze and cue movement patterns, analyze and modify body mechanics/ergonomics, assess and modify postural abnormalities and instruct in home and community integration to attain remaining goals. []  See Plan of Care  []  See progress note/recertification  []  See Discharge Summary         Progress towards goals / Updated goals:  Short Term Goals: To be accomplished in 2 weeks:  1.  Pt will report compliance with his HEP to maximize therapeutic success.              Eval: HEP assigned              Current: progressing, reports initiation of HEP forward flexion stretching and HS stretching at work. (9/1/2021)  2. Pt will report no > 6/10 pain on average, indicating improved centralization of pain.              Eval: 7-12/10              Current: remains, about the same, 7/10 with radiation down the anterior and lateral thigh and \"sharp\" pain in the right buttock. (9/1/2021)  Long Term Goals: To be accomplished in 4 weeks:  1. Pt will improve his FOTO score to 63, demonstrating improved functional capacity for ADLs.             Eval: 45  2. Pt will demonstrate WNL lumbar ROM to improve ease of self care.              Eval: 25% extension and right sidebend with pain, 75% all other motions  3. Pt will improve his B hip strength to grossly MMT 4+/5 to improve ease of ADLs and recreation.              Eval: right hip flexors 3/5, glute med 4-/5, glute max 2+/5, left glute med/hip flexot 4/5, left glute max 3/5  4. Pt will improve his B quadriceps and HS strength to 5-/5 MMT to improve ease and comfort of ambulation.              Eval: left quads/HS 3+/5, right hS 4/5  5. Pt will demonstrate negative peripheralization with modified Manoj stretching of the right LE, indicating improved soft tissue extensibility and reduced pain.              Eval: (+) increased numbness and tingling               Current: progressing, a little tingling but improved from her last visit. (9/8/2021)  6. Pt will report </= 3/10 pain with STS and ambulation, indicating improved pain for QOL.                Eval: 7-12/10     PLAN  []  Upgrade activities as tolerated     [x]  Continue plan of care  []  Update interventions per flow sheet       []  Discharge due to:_  []  Other:_      Kellie العلي, PT 9/16/2021  5:20 PM    Future Appointments   Date Time Provider Herminia Henderson   9/20/2021  4:30 PM [address] [place] [address]   9/22/2021  4:30 PM [place], MATTHEW [facility]   3/11/2022  3:40 PM [address] [facility]   3/18/2022  4:00 PM Cesar Ayala MD [address] [facility]

## 2021-09-20 ENCOUNTER — HOSPITAL ENCOUNTER (OUTPATIENT)
Dept: PHYSICAL THERAPY | Age: 51
Discharge: HOME | End: 2021-09-20
Payer: COMMERCIAL

## 2021-09-20 PROCEDURE — 97112 NEUROMUSCULAR REEDUCATION: CPT

## 2021-09-20 PROCEDURE — 97110 THERAPEUTIC EXERCISES: CPT

## 2021-09-20 PROCEDURE — 97140 MANUAL THERAPY 1/> REGIONS: CPT

## 2021-09-20 NOTE — PROGRESS NOTES
PT DAILY TREATMENT NOTE     Patient Name: Russ Sullivan  Date:2021  : 1970  [x]  Patient  Verified  Payor: BLUE CROSS / Plan: [address] [place] [address] [place] / Product Type: PPO /    In time:4:30  Out time:5:26  Total Treatment Time (min): 56  Visit #: 5 of 8    Medicare/BCBS Only   Total Timed Codes (min):  46 1:1 Treatment Time:  46       Treatment Area: Acute right-sided low back pain [M54.5]    SUBJECTIVE  Pain Level (0-10 scale): 3 back   Any medication changes, allergies to medications, adverse drug reactions, diagnosis change, or new procedure performed?: [x] No    [] Yes (see summary sheet for update)  Subjective functional status/changes:   [] No changes reported  The pt reports his low back is feeling as good as it has in a long time.  Still having numbness in the anterior right leg to the foot    OBJECTIVE    Modality rationale: decrease inflammation and decrease pain to improve the patients ability to perform ADLs   Min Type Additional Details    [] Estim:  []Unatt       []IFC  []Premod                        []Other:  []w/ice   []w/heat  Position:  Location:    [] Estim: []Att    []TENS instruct  []NMES                    []Other:  []w/US   []w/ice   []w/heat  Position:  Location:    []  Traction: [] Cervical       []Lumbar                       [] Prone          []Supine                       []Intermittent   []Continuous Lbs:  [] before manual  [] after manual    []  Ultrasound: []Continuous   [] Pulsed                           []1MHz   []3MHz W/cm2:  Location:    []  Iontophoresis with dexamethasone         Location: [] Take home patch   [] In clinic   10 [x]  Ice     []  heat  []  Ice massage  []  Laser   []  Anodyne Position: seated  Location: lumbar    []  Laser with stim  []  Other:  Position:  Location:    []  Vasopneumatic Device    []  Right     []  Left  Pre-treatment girth:  Post-treatment girth:  Measured at (location):  Pressure:       [] lo [] med [] hi   Temperature: [] lo [] med [] hi   [] Skin assessment post-treatment:  []intact []redness- no adverse reaction    []redness  adverse reaction:       14 min Therapeutic Exercise:  [] See flow sheet :   Rationale: increase ROM and increase strength to improve the patients ability to perform daily tasks and self care    24 min Neuromuscular Re-education:  []  See flow sheet :   Rationale: increase strength, improve coordination and increase proprioception  to improve the patients ability to perform ADLs with improved core engagement and mechanics to reduce back pain     8 min Manual Therapy:  Prone right LE traction   The manual therapy interventions were performed at a separate and distinct time from the therapeutic activities interventions. Rationale: decrease pain and increase tissue extensibility to improve ease of ADLs with less peripheralization              With   [] TE   [] TA   [] neuro   [] other: Patient Education: [x] Review HEP    [] Progressed/Changed HEP based on:   [] positioning   [] body mechanics   [] transfers   [] heat/ice application    [] other:      Other Objective/Functional Measures:      Pain Level (0-10 scale) post treatment: 1    ASSESSMENT/Changes in Function: The pt reports feeling much better since [facility]. He does still report pain/paresthesia with sit to stands. No peripheralization noted during session, improved trunk ROM noted as well. Pt would benefit from continued PT to progress centralization and core strength    Patient will continue to benefit from skilled PT services to modify and progress therapeutic interventions, address functional mobility deficits, address ROM deficits, address strength deficits, analyze and address soft tissue restrictions, analyze and cue movement patterns and analyze and modify body mechanics/ergonomics to attain remaining goals.      []  See Plan of Care  [x]  See progress note/recertification  []  See Discharge Summary         Progress towards goals / Updated goals:  Short Term Goals: To be accomplished in 2 weeks:  1. Pt will report compliance with his HEP to maximize therapeutic success.              Eval: HEP assigned              Current: progressing, reports initiation of HEP forward flexion stretching and HS stretching at work. (9/1/2021)  2. Pt will report no > 6/10 pain on average, indicating improved centralization of pain.              Eval: 7-12/10              Current: remains, about the same, 7/10 with radiation down the anterior and lateral thigh and \"sharp\" pain in the right buttock. (9/1/2021)  Long Term Goals: To be accomplished in 4 weeks:  1. Pt will improve his FOTO score to 63, demonstrating improved functional capacity for ADLs.             Eval: 45   Current: Met 65 9/20/2021  2. Pt will demonstrate WNL lumbar ROM to improve ease of self care.              Eval: 25% extension and right sidebend with pain, 75% all other motions   Current: near met 75% all directions aside from right SB 9/20/2021  3. Pt will improve his B hip strength to grossly MMT 4+/5 to improve ease of ADLs and recreation.              Eval: right hip flexors 3/5, glute med 4-/5, glute max 2+/5, left glute med/hip flexot 4/5, left glute max 3/5  4. Pt will improve his B quadriceps and HS strength to 5-/5 MMT to improve ease and comfort of ambulation.              Eval: left quads/HS 3+/5, right hS 4/5  5. Pt will demonstrate negative peripheralization with modified Manoj stretching of the right LE, indicating improved soft tissue extensibility and reduced pain.              Eval: (+) increased numbness and tingling               Current: progressing, a little tingling but improved from her last visit. (9/8/2021); Met no noted peripheralization today 9/20/2021  6. Pt will report </= 3/10 pain with STS and ambulation, indicating improved pain for QOL.                Eval: 7-12/10   Current: No pain increase just tingling 9/20/2021    PLAN  []  Upgrade activities as tolerated [x]  Continue plan of care  []  Update interventions per flow sheet       []  Discharge due to:_  []  Other:_      Darylene Ringer DPT CMTPT 9/20/2021  4:35 PM    Future Appointments   Date Time Provider Herminia Henderson   9/22/2021  4:30 PM [place] PTA [id number]   3/11/2022  3:40 PM [address] [facility]   3/18/2022  4:00 PM Vy Cardoso MD [address] [facility]

## 2021-09-21 NOTE — PROGRESS NOTES
[facility]  [address]  [place], [address]  [phone] fax    Physical Therapy Progress Note  Patient name: Pauline Horta Start of Care: 2021   Referral source: Rose Acevedo NP : 1970                Medical Diagnosis: Acute right-sided low back pain [M54.5]  Payor: BLUE CROSS / Plan: [employer] [facility] [place] / Product Type: PPO /  Onset Date:1 mo                Treatment Diagnosis: LBP, right LE pain   Prior Hospitalization: see medical history Provider#: [id number]   Medications: Verified on Patient summary List    Comorbidities: arthritis, back pain, headaches, prostate problems with scrotal pain, sleep dysfunction, L5/S1 spondylolisthesis    Prior Level of Function: Previously had occasional bouts of left sided LBP. Pt reports he has been active with walking, going to the gym, and golfing. Visits from Start of Care: 5    Missed Visits: 1      Established Goals:        Excellent         Good         Limited            None  [x] Increased ROM   []  [x]  []  []  [] Increased Strength  []  []  []  []  [] Increased Mobility  []  []  []  []   [x] Decreased Pain   []  [x]  []  []  [] Decreased Swelling  []  []  []  []    Key Functional Changes:   Short Term Goals: To be accomplished in 2 weeks:  1. Pt will report compliance with his HEP to maximize therapeutic success.              Eval: HEP assigned              Current: progressing, reports initiation of HEP forward flexion stretching and HS stretching at work. (2021)  2. Pt will report no > 6/10 pain on average, indicating improved centralization of pain.              Eval: 7-12/10              Current: remains, about the same, 7/10 with radiation down the anterior and lateral thigh and \"sharp\" pain in the right buttock. (2021)  Long Term Goals: To be accomplished in 4 weeks:  1.  Pt will improve his FOTO score to 63, demonstrating improved functional capacity for ADLs.              Eval: 45              Current: Met 65 9/20/2021  2. Pt will demonstrate WNL lumbar ROM to improve ease of self care.              Eval: 25% extension and right sidebend with pain, 75% all other motions              Current: near met 75% all directions aside from right SB 9/20/2021  3. Pt will improve his B hip strength to grossly MMT 4+/5 to improve ease of ADLs and recreation.              Eval: right hip flexors 3/5, glute med 4-/5, glute max 2+/5, left glute med/hip flexot 4/5, left glute max 3/5  4. Pt will improve his B quadriceps and HS strength to 5-/5 MMT to improve ease and comfort of ambulation.              Eval: left quads/HS 3+/5, right hS 4/5  5. Pt will demonstrate negative peripheralization with modified Manoj stretching of the right LE, indicating improved soft tissue extensibility and reduced pain.              Eval: (+) increased numbness and tingling               Current: progressing, a little tingling but improved from her last visit. (9/8/2021); Met no noted peripheralization today 9/20/2021  6. Pt will report </= 3/10 pain with STS and ambulation, indicating improved pain for QOL.                Eval: 7-12/10              Current: No pain increase just tingling 9/20/2021    Updated Goals: to be achieved in 4 weeks:  1. Pt will improve his B hip strength to grossly MMT 4+/5 to improve ease of ADLs and recreation. PN:  right hip flexors 3/5, glute med 4-/5, glute max 2+/5, left glute med/hip flexot 4/5, left glute max 3/5  2. Pt will improve his B quadriceps and HS strength to 5-/5 MMT to improve ease and comfort of ambulation. PN: left quads/HS 3+/5, right HS 4/5  3. Pt will demonstrate WNL lumbar right SB ROM to improve ease of self care. PN: 50% right SB with discomfort  4. Pt will report ability to perform sit to stand transfers without onset of right LE numbness/tingling to improve functional mobility and quality of life.   PN: pt reporting anterior LE numbness/tingling with sit to stand    ASSESSMENT/RECOMMENDATIONS: The pt reports feeling much better since [facility]. He does still report pain/paresthesia with sit to stands. No peripheralization noted during session, improved trunk ROM noted as well. Pt would benefit from continued PT to progress centralization and core strength    [x]Continue therapy per initial plan/protocol at a frequency of  2 x per week for 4 weeks  []Continue therapy with the following recommended changes:_____________________      _____________________________________________________________________  []Discontinue therapy progressing towards or have reached established goals  []Discontinue therapy due to lack of appreciable progress towards goals  []Discontinue therapy due to lack of attendance or compliance  []Await Physician's recommendations/decisions regarding therapy  []Other:________________________________________________________________    Thank you for this referral.    Sola Thomas  DPT CMTPT 9/21/2021 11:27 AM  NOTE TO PHYSICIAN:  PLEASE COMPLETE THE ORDERS BELOW AND   FAX TO [facility]: [phone]  If you are unable to process this request in 24 hours please contact our office: [phone] I have read the above report and request that my patient continue as recommended. ? I have read the above report and request that my patient continue therapy with the following changes/special instructions:__________________________________________________________  ? I have read the above report and request that my patient be discharged from therapy.     Physicians signature: ______________________________Date: ______Time:______     Giselle Cheung NP

## 2021-09-22 ENCOUNTER — APPOINTMENT (OUTPATIENT)
Dept: PHYSICAL THERAPY | Age: 51
End: 2021-09-22
Payer: COMMERCIAL

## 2021-11-04 NOTE — PROGRESS NOTES
[facility]  [address]  [place], [address]  [phone] fax    Physical Therapy Discharge Summary  Patient name: Katina Hazel Start of Care: 2021   Referral source: Maria Luisa Jenkins NP : 1970   Medical/Treatment Diagnosis: Acute right-sided low back pain [M54.5]  Payor: BLUE CROSS / Plan: [employer] [facility] [place] / Product Type: PPO /  Onset Date:1 mo prior to [facility]     Prior Hospitalization: see medical history Provider#: [id number]   Medications: Verified on Patient Summary List    Comorbidities: arthritis, back pain, headaches, prostate problems with scrotal pain, sleep dysfunction, L5/S1 spondylolisthesis    Prior Level of Function: Previously had occasional bouts of left sided LBP. Pt reports he has been active with walking, going to the gym, and golfing. Visits from Start of Care: 5    Missed Visits: 0  Reporting Period : 2021 to 2021      Summary of Care:  1. Pt will improve his B hip strength to grossly MMT 4+/5 to improve ease of ADLs and recreation. PN:  right hip flexors 3/5, glute med 4-/5, glute max 2+/5, left glute med/hip flexot 4/5, left glute max 3/5  2. Pt will improve his B quadriceps and HS strength to 5-/5 MMT to improve ease and comfort of ambulation. PN: left quads/HS 3+/5, right HS 4/5  3. Pt will demonstrate WNL lumbar right SB ROM to improve ease of self care. PN: 50% right SB with discomfort  4. Pt will report ability to perform sit to stand transfers without onset of right LE numbness/tingling to improve functional mobility and quality of life. PN: pt reporting anterior LE numbness/tingling with sit to stand      ASSESSMENT/RECOMMENDATIONS:   Pt has not returned to therapy since 2021 and is being discharged at this time. Unable to reassess goals at this time. D/C without further pt instructions.  Thank you for this referral    [x]Discontinue therapy: []Patient has reached or is progressing toward set goals      [x]Patient is non-compliant or has abdicated      []Due to lack of appreciable progress towards set goals    Sly Beasley, PT 11/4/2021 9:11 AM